# Patient Record
Sex: MALE | Race: WHITE | NOT HISPANIC OR LATINO | Employment: FULL TIME | URBAN - METROPOLITAN AREA
[De-identification: names, ages, dates, MRNs, and addresses within clinical notes are randomized per-mention and may not be internally consistent; named-entity substitution may affect disease eponyms.]

---

## 2019-10-30 ENCOUNTER — OFFICE VISIT (OUTPATIENT)
Dept: GASTROENTEROLOGY | Facility: CLINIC | Age: 49
End: 2019-10-30

## 2019-10-30 VITALS
DIASTOLIC BLOOD PRESSURE: 92 MMHG | BODY MASS INDEX: 25.05 KG/M2 | TEMPERATURE: 98.5 F | SYSTOLIC BLOOD PRESSURE: 140 MMHG | WEIGHT: 175 LBS | HEIGHT: 70 IN

## 2019-10-30 DIAGNOSIS — R10.10 PAIN OF UPPER ABDOMEN: Primary | ICD-10-CM

## 2019-10-30 DIAGNOSIS — Z12.11 ENCOUNTER FOR SCREENING FOR MALIGNANT NEOPLASM OF COLON: ICD-10-CM

## 2019-10-30 PROCEDURE — 99204 OFFICE O/P NEW MOD 45 MIN: CPT | Performed by: INTERNAL MEDICINE

## 2019-10-30 RX ORDER — UBIDECARENONE 100 MG
100 CAPSULE ORAL DAILY
COMMUNITY

## 2019-10-30 RX ORDER — SODIUM CHLORIDE, SODIUM LACTATE, POTASSIUM CHLORIDE, CALCIUM CHLORIDE 600; 310; 30; 20 MG/100ML; MG/100ML; MG/100ML; MG/100ML
30 INJECTION, SOLUTION INTRAVENOUS CONTINUOUS
Status: CANCELLED | OUTPATIENT
Start: 2019-11-20

## 2019-10-30 RX ORDER — OMEPRAZOLE 40 MG/1
40 CAPSULE, DELAYED RELEASE ORAL DAILY
COMMUNITY
Start: 2019-10-14 | End: 2020-10-13

## 2019-10-30 NOTE — PROGRESS NOTES
Chief Complaint   Patient presents with   • Abdominal Pain   • Constipation   • Heartburn     Subjective   HPI     George Magill is a 49 y.o. male who presents for evaluation of abdominal pain    Describes sensation of pain in stomach area.  Present for last 6-12 mos.    Occasional red blood with BMs.  Has been more constipated than usual last few weeks.    Sx associated with overall lack of energy and some unintentional weight loss    Recently seen in UC - rx for carafate and prilosec with improvement in sx.  He has also modified diet and cut out sodas.      Patient works in "[employer] 60+hrs week      Past Medical History:   Diagnosis Date   • Asthma     childhood       Current Outpatient Medications:   •  coenzyme Q10 100 MG capsule, Take 100 mg by mouth Daily., Disp: , Rfl:   •  omeprazole (priLOSEC) 40 MG capsule, Take 40 mg by mouth Daily., Disp: , Rfl:   •  Psyllium (METAMUCIL) 0.36 g capsule, Take  by mouth Daily., Disp: , Rfl:   Allergies   Allergen Reactions   • Asa [Aspirin] Hives and Swelling     Social History     Socioeconomic History   • Marital status: Single     Spouse name: Not on file   • Number of children: Not on file   • Years of education: Not on file   • Highest education level: Not on file   Tobacco Use   • Smoking status: Current Every Day Smoker     Packs/day: 0.25   • Smokeless tobacco: Never Used   • Tobacco comment: ave one cigarette daily    Substance and Sexual Activity   • Alcohol use: Yes     Comment: 1 drink per day average    • Drug use: No     Family History   Problem Relation Age of Onset   • Prostate cancer Father      Review of Systems   Constitutional: Positive for appetite change, fatigue and unexpected weight change.   Gastrointestinal: Positive for abdominal pain, anal bleeding, constipation and nausea.   All other systems reviewed and are negative.    Objective   Vitals:    10/30/19 0946   BP: 140/92   Temp: 98.5 °F (36.9 °C)     Physical Exam   Constitutional: He is  oriented to person, place, and time. He appears well-developed and well-nourished.   HENT:   Head: Normocephalic and atraumatic.   Mouth/Throat: Oropharynx is clear and moist.   Eyes: Conjunctivae are normal. No scleral icterus.   Neck: Normal range of motion. Neck supple. No thyromegaly present.   Cardiovascular: Normal rate and regular rhythm. Exam reveals no friction rub.   No murmur heard.  Pulmonary/Chest: Effort normal and breath sounds normal. No respiratory distress. He has no wheezes. He has no rales. He exhibits no tenderness.   Abdominal: Soft. Bowel sounds are normal. He exhibits no distension and no mass. There is no tenderness. There is no rebound and no guarding. No hernia.   Musculoskeletal: He exhibits no edema.   Lymphadenopathy:     He has no cervical adenopathy.     He has no axillary adenopathy.   Neurological: He is alert and oriented to person, place, and time.   Skin: Skin is warm and dry. No rash noted.   Psychiatric: He has a normal mood and affect. His behavior is normal. Judgment and thought content normal.   Vitals reviewed.    Assessment/Plan   Assessment:     1. Pain of upper abdomen    2. Encounter for screening for malignant neoplasm of colon      Plan:   Recommend we proceed with EGD to evaluate patients upper GI complaints  Continue PPI and carafate for now  Request labs from recent UC visit  Average risk screening colonoscopy will be scheduled at time of EGD as well.         Calvin Dacosta M.D.  [facility]  [address] [place] [address]  Office: [phone]

## 2019-11-20 ENCOUNTER — ANESTHESIA EVENT (OUTPATIENT)
Dept: GASTROENTEROLOGY | Facility: HOSPITAL | Age: 49
End: 2019-11-20

## 2019-11-20 ENCOUNTER — ANESTHESIA (OUTPATIENT)
Dept: GASTROENTEROLOGY | Facility: HOSPITAL | Age: 49
End: 2019-11-20

## 2019-11-20 ENCOUNTER — HOSPITAL ENCOUNTER (OUTPATIENT)
Facility: HOSPITAL | Age: 49
Setting detail: HOSPITAL OUTPATIENT SURGERY
Discharge: HOME | End: 2019-11-20
Attending: INTERNAL MEDICINE | Admitting: INTERNAL MEDICINE

## 2019-11-20 VITALS
SYSTOLIC BLOOD PRESSURE: 133 MMHG | RESPIRATION RATE: 14 BRPM | HEIGHT: 70 IN | DIASTOLIC BLOOD PRESSURE: 94 MMHG | OXYGEN SATURATION: 99 % | HEART RATE: 80 BPM | BODY MASS INDEX: 26.13 KG/M2 | WEIGHT: 182.5 LBS | TEMPERATURE: 98.2 F

## 2019-11-20 DIAGNOSIS — Z12.11 ENCOUNTER FOR SCREENING FOR MALIGNANT NEOPLASM OF COLON: ICD-10-CM

## 2019-11-20 DIAGNOSIS — R10.10 PAIN OF UPPER ABDOMEN: ICD-10-CM

## 2019-11-20 PROCEDURE — 88305 TISSUE EXAM BY PATHOLOGIST: CPT | Performed by: INTERNAL MEDICINE

## 2019-11-20 PROCEDURE — 25010000002 PROPOFOL 10 MG/ML EMULSION: Performed by: NURSE ANESTHETIST, CERTIFIED REGISTERED

## 2019-11-20 PROCEDURE — 43239 EGD BIOPSY SINGLE/MULTIPLE: CPT | Performed by: INTERNAL MEDICINE

## 2019-11-20 PROCEDURE — 45385 COLONOSCOPY W/LESION REMOVAL: CPT | Performed by: INTERNAL MEDICINE

## 2019-11-20 RX ORDER — PROPOFOL 10 MG/ML
VIAL (ML) INTRAVENOUS CONTINUOUS PRN
Status: DISCONTINUED | OUTPATIENT
Start: 2019-11-20 | End: 2019-11-20 | Stop reason: SURG

## 2019-11-20 RX ORDER — HYDROCORTISONE ACETATE 25 MG/1
25 SUPPOSITORY RECTAL NIGHTLY PRN
Qty: 30 SUPPOSITORY | Refills: 1 | Status: SHIPPED | OUTPATIENT
Start: 2019-11-20

## 2019-11-20 RX ORDER — SODIUM CHLORIDE, SODIUM LACTATE, POTASSIUM CHLORIDE, CALCIUM CHLORIDE 600; 310; 30; 20 MG/100ML; MG/100ML; MG/100ML; MG/100ML
30 INJECTION, SOLUTION INTRAVENOUS CONTINUOUS
Status: DISCONTINUED | OUTPATIENT
Start: 2019-11-20 | End: 2019-11-20 | Stop reason: HOSPADM

## 2019-11-20 RX ORDER — PROPOFOL 10 MG/ML
VIAL (ML) INTRAVENOUS AS NEEDED
Status: DISCONTINUED | OUTPATIENT
Start: 2019-11-20 | End: 2019-11-20 | Stop reason: SURG

## 2019-11-20 RX ORDER — LIDOCAINE HYDROCHLORIDE 20 MG/ML
INJECTION, SOLUTION INFILTRATION; PERINEURAL AS NEEDED
Status: DISCONTINUED | OUTPATIENT
Start: 2019-11-20 | End: 2019-11-20 | Stop reason: SURG

## 2019-11-20 RX ORDER — GLYCOPYRROLATE 0.2 MG/ML
INJECTION INTRAMUSCULAR; INTRAVENOUS AS NEEDED
Status: DISCONTINUED | OUTPATIENT
Start: 2019-11-20 | End: 2019-11-20 | Stop reason: SURG

## 2019-11-20 RX ADMIN — PROPOFOL 300 MCG/KG/MIN: 10 INJECTION, EMULSION INTRAVENOUS at 13:25

## 2019-11-20 RX ADMIN — LIDOCAINE HYDROCHLORIDE 100 MG: 20 INJECTION, SOLUTION INFILTRATION; PERINEURAL at 13:25

## 2019-11-20 RX ADMIN — SODIUM CHLORIDE, POTASSIUM CHLORIDE, SODIUM LACTATE AND CALCIUM CHLORIDE 30 ML/HR: 600; 310; 30; 20 INJECTION, SOLUTION INTRAVENOUS at 13:07

## 2019-11-20 RX ADMIN — GLYCOPYRROLATE 100 MCG: 0.2 INJECTION INTRAMUSCULAR; INTRAVENOUS at 13:21

## 2019-11-20 RX ADMIN — SODIUM CHLORIDE, POTASSIUM CHLORIDE, SODIUM LACTATE AND CALCIUM CHLORIDE: 600; 310; 30; 20 INJECTION, SOLUTION INTRAVENOUS at 13:21

## 2019-11-20 RX ADMIN — PROPOFOL 150 MG: 10 INJECTION, EMULSION INTRAVENOUS at 13:25

## 2019-11-20 NOTE — ANESTHESIA POSTPROCEDURE EVALUATION
Patient: George Magill    Procedure Summary     Date:  11/20/19 Room / Location:   [facility]    Anesthesia Start:  1321 Anesthesia Stop:  1358    Procedures:       ESOPHAGOGASTRODUODENOSCOPY WITH BIOPSY (N/A Esophagus)      COLONOSCOPY to cecum wit cold snare polypectomies (N/A ) Diagnosis:       Pain of upper abdomen      Encounter for screening for malignant neoplasm of colon      (Pain of upper abdomen [R10.10])      (Encounter for screening for malignant neoplasm of colon [Z12.11])    Surgeon:  Calvin Dacosta MD Provider:  Jan Merritt MD    Anesthesia Type:  MAC ASA Status:  2          Anesthesia Type: MAC  Last vitals  BP   133/94 (11/20/19 1419)   Temp   36.8 °C (98.2 °F) (11/20/19 1250)   Pulse   80 (11/20/19 1419)   Resp   14 (11/20/19 1419)     SpO2   99 % (11/20/19 1419)     Post Anesthesia Care and Evaluation    Anesthetic complications: No anesthetic complications

## 2019-11-20 NOTE — ANESTHESIA PREPROCEDURE EVALUATION
Anesthesia Evaluation     Patient summary reviewed and Nursing notes reviewed   no history of anesthetic complications:  NPO Solid Status: > 6 hours  NPO Liquid Status: > 6 hours           Airway   Mallampati: II  TM distance: >3 FB  Neck ROM: full  no difficulty expected and No difficulty expected  Dental - normal exam     Pulmonary - normal exam    breath sounds clear to auscultation  (+) asthma,  (-) rhonchi, decreased breath sounds, wheezes, rales, stridor  Cardiovascular - negative cardio ROS and normal exam    NYHA Classification: I  Rhythm: regular  Rate: normal    (-) murmur, weak pulses, friction rub, systolic click, carotid bruits, JVD, peripheral edema      Neuro/Psych- negative ROS  GI/Hepatic/Renal/Endo    (+)  GERD,      Musculoskeletal (-) negative ROS    Abdominal  - normal exam    Abdomen: soft.   Substance History - negative use     OB/GYN negative ob/gyn ROS         Other - negative ROS                       Anesthesia Plan    ASA 2     MAC     intravenous induction     Anesthetic plan, all risks, benefits, and alternatives have been provided, discussed and informed consent has been obtained with: patient.

## 2019-11-21 LAB
CYTO UR: NORMAL
LAB AP CASE REPORT: NORMAL
PATH REPORT.FINAL DX SPEC: NORMAL
PATH REPORT.GROSS SPEC: NORMAL

## 2019-12-06 ENCOUNTER — TELEPHONE (OUTPATIENT)
Dept: GASTROENTEROLOGY | Facility: CLINIC | Age: 49
End: 2019-12-06

## 2019-12-06 NOTE — TELEPHONE ENCOUNTER
----- Message from Calvin Dacosta MD sent at 11/26/2019 11:27 AM EST -----  Negative EGD bx  Colon polyps were benign  Recall 3 years due to borderline prep  O/V in 6 weeks with BG

## 2021-04-02 ENCOUNTER — BULK ORDERING (OUTPATIENT)
Dept: CASE MANAGEMENT | Facility: OTHER | Age: 51
End: 2021-04-02

## 2021-04-02 DIAGNOSIS — Z23 IMMUNIZATION DUE: ICD-10-CM

## 2022-11-17 ENCOUNTER — DOCUMENTATION (OUTPATIENT)
Dept: GASTROENTEROLOGY | Facility: CLINIC | Age: 52
End: 2022-11-17

## 2023-09-13 ENCOUNTER — HOSPITAL ENCOUNTER (INPATIENT)
Facility: HOSPITAL | Age: 53
LOS: 1 days | Discharge: HOME | End: 2023-09-15
Attending: EMERGENCY MEDICINE | Admitting: STUDENT IN AN ORGANIZED HEALTH CARE EDUCATION/TRAINING PROGRAM

## 2023-09-13 ENCOUNTER — APPOINTMENT (OUTPATIENT)
Dept: GENERAL RADIOLOGY | Facility: HOSPITAL | Age: 53
End: 2023-09-13

## 2023-09-13 DIAGNOSIS — R07.9 CHEST PAIN, UNSPECIFIED TYPE: Primary | ICD-10-CM

## 2023-09-13 DIAGNOSIS — I48.92 ATRIAL FLUTTER WITH RAPID VENTRICULAR RESPONSE: ICD-10-CM

## 2023-09-13 LAB
ALBUMIN SERPL-MCNC: 4.3 G/DL (ref 3.5–5.2)
ALBUMIN/GLOB SERPL: 1.6 G/DL
ALP SERPL-CCNC: 120 U/L (ref 39–117)
ALT SERPL W P-5'-P-CCNC: 26 U/L (ref 1–41)
ANION GAP SERPL CALCULATED.3IONS-SCNC: 10 MMOL/L (ref 5–15)
AST SERPL-CCNC: 28 U/L (ref 1–40)
BASOPHILS # BLD AUTO: 0.05 10*3/MM3 (ref 0–0.2)
BASOPHILS NFR BLD AUTO: 0.5 % (ref 0–1.5)
BILIRUB SERPL-MCNC: 0.2 MG/DL (ref 0–1.2)
BUN SERPL-MCNC: 31 MG/DL (ref 6–20)
BUN/CREAT SERPL: 30.7 (ref 7–25)
CALCIUM SPEC-SCNC: 9.3 MG/DL (ref 8.6–10.5)
CHLORIDE SERPL-SCNC: 105 MMOL/L (ref 98–107)
CO2 SERPL-SCNC: 23 MMOL/L (ref 22–29)
CREAT SERPL-MCNC: 1.01 MG/DL (ref 0.76–1.27)
D DIMER PPP FEU-MCNC: 0.44 MCGFEU/ML (ref 0–0.52)
DEPRECATED RDW RBC AUTO: 42.5 FL (ref 37–54)
EGFRCR SERPLBLD CKD-EPI 2021: 89.5 ML/MIN/1.73
EOSINOPHIL # BLD AUTO: 0.39 10*3/MM3 (ref 0–0.4)
EOSINOPHIL NFR BLD AUTO: 4 % (ref 0.3–6.2)
ERYTHROCYTE [DISTWIDTH] IN BLOOD BY AUTOMATED COUNT: 13.4 % (ref 12.3–15.4)
GEN 5 2HR TROPONIN T REFLEX: 27 NG/L
GLOBULIN UR ELPH-MCNC: 2.7 GM/DL
GLUCOSE SERPL-MCNC: 105 MG/DL (ref 65–99)
HCT VFR BLD AUTO: 42.4 % (ref 37.5–51)
HGB BLD-MCNC: 14.7 G/DL (ref 13–17.7)
IMM GRANULOCYTES # BLD AUTO: 0.02 10*3/MM3 (ref 0–0.05)
IMM GRANULOCYTES NFR BLD AUTO: 0.2 % (ref 0–0.5)
LYMPHOCYTES # BLD AUTO: 1.81 10*3/MM3 (ref 0.7–3.1)
LYMPHOCYTES NFR BLD AUTO: 18.8 % (ref 19.6–45.3)
MCH RBC QN AUTO: 30.5 PG (ref 26.6–33)
MCHC RBC AUTO-ENTMCNC: 34.7 G/DL (ref 31.5–35.7)
MCV RBC AUTO: 88 FL (ref 79–97)
MONOCYTES # BLD AUTO: 0.84 10*3/MM3 (ref 0.1–0.9)
MONOCYTES NFR BLD AUTO: 8.7 % (ref 5–12)
NEUTROPHILS NFR BLD AUTO: 6.53 10*3/MM3 (ref 1.7–7)
NEUTROPHILS NFR BLD AUTO: 67.8 % (ref 42.7–76)
NRBC BLD AUTO-RTO: 0 /100 WBC (ref 0–0.2)
NT-PROBNP SERPL-MCNC: 1879 PG/ML (ref 0–900)
PLATELET # BLD AUTO: 201 10*3/MM3 (ref 140–450)
PMV BLD AUTO: 10.2 FL (ref 6–12)
POTASSIUM SERPL-SCNC: 4.3 MMOL/L (ref 3.5–5.2)
PROT SERPL-MCNC: 7 G/DL (ref 6–8.5)
QT INTERVAL: 289 MS
QTC INTERVAL: 446 MS
RBC # BLD AUTO: 4.82 10*6/MM3 (ref 4.14–5.8)
SODIUM SERPL-SCNC: 138 MMOL/L (ref 136–145)
TROPONIN T DELTA: 4 NG/L
TROPONIN T SERPL HS-MCNC: 23 NG/L
TROPONIN T SERPL HS-MCNC: 29 NG/L
WBC NRBC COR # BLD: 9.64 10*3/MM3 (ref 3.4–10.8)

## 2023-09-13 PROCEDURE — G0378 HOSPITAL OBSERVATION PER HR: HCPCS

## 2023-09-13 PROCEDURE — 25010000002 ENOXAPARIN PER 10 MG: Performed by: EMERGENCY MEDICINE

## 2023-09-13 PROCEDURE — 85025 COMPLETE CBC W/AUTO DIFF WBC: CPT | Performed by: EMERGENCY MEDICINE

## 2023-09-13 PROCEDURE — 25010000002 MORPHINE PER 10 MG: Performed by: EMERGENCY MEDICINE

## 2023-09-13 PROCEDURE — 25010000002 DIGOXIN PER 500 MCG: Performed by: STUDENT IN AN ORGANIZED HEALTH CARE EDUCATION/TRAINING PROGRAM

## 2023-09-13 PROCEDURE — 84484 ASSAY OF TROPONIN QUANT: CPT | Performed by: STUDENT IN AN ORGANIZED HEALTH CARE EDUCATION/TRAINING PROGRAM

## 2023-09-13 PROCEDURE — 84484 ASSAY OF TROPONIN QUANT: CPT | Performed by: EMERGENCY MEDICINE

## 2023-09-13 PROCEDURE — 99285 EMERGENCY DEPT VISIT HI MDM: CPT

## 2023-09-13 PROCEDURE — 93005 ELECTROCARDIOGRAM TRACING: CPT | Performed by: EMERGENCY MEDICINE

## 2023-09-13 PROCEDURE — 83880 ASSAY OF NATRIURETIC PEPTIDE: CPT | Performed by: EMERGENCY MEDICINE

## 2023-09-13 PROCEDURE — 80053 COMPREHEN METABOLIC PANEL: CPT | Performed by: EMERGENCY MEDICINE

## 2023-09-13 PROCEDURE — 85379 FIBRIN DEGRADATION QUANT: CPT | Performed by: EMERGENCY MEDICINE

## 2023-09-13 PROCEDURE — 71045 X-RAY EXAM CHEST 1 VIEW: CPT

## 2023-09-13 PROCEDURE — 25010000002 ONDANSETRON PER 1 MG: Performed by: EMERGENCY MEDICINE

## 2023-09-13 PROCEDURE — 93010 ELECTROCARDIOGRAM REPORT: CPT | Performed by: INTERNAL MEDICINE

## 2023-09-13 PROCEDURE — 25010000002 FUROSEMIDE PER 20 MG: Performed by: EMERGENCY MEDICINE

## 2023-09-13 RX ORDER — NITROGLYCERIN 0.4 MG/1
0.4 TABLET SUBLINGUAL
Status: DISCONTINUED | OUTPATIENT
Start: 2023-09-13 | End: 2023-09-15 | Stop reason: HOSPADM

## 2023-09-13 RX ORDER — FAMOTIDINE 20 MG/1
20 TABLET, FILM COATED ORAL ONCE
Status: COMPLETED | OUTPATIENT
Start: 2023-09-13 | End: 2023-09-13

## 2023-09-13 RX ORDER — BISACODYL 10 MG
10 SUPPOSITORY, RECTAL RECTAL DAILY PRN
Status: DISCONTINUED | OUTPATIENT
Start: 2023-09-13 | End: 2023-09-15 | Stop reason: HOSPADM

## 2023-09-13 RX ORDER — ONDANSETRON 2 MG/ML
4 INJECTION INTRAMUSCULAR; INTRAVENOUS ONCE
Status: COMPLETED | OUTPATIENT
Start: 2023-09-13 | End: 2023-09-13

## 2023-09-13 RX ORDER — BISACODYL 5 MG/1
5 TABLET, DELAYED RELEASE ORAL DAILY PRN
Status: DISCONTINUED | OUTPATIENT
Start: 2023-09-13 | End: 2023-09-15 | Stop reason: HOSPADM

## 2023-09-13 RX ORDER — OMEPRAZOLE 40 MG/1
40 CAPSULE, DELAYED RELEASE ORAL AS NEEDED
COMMUNITY

## 2023-09-13 RX ORDER — DIGOXIN 0.25 MG/ML
500 INJECTION INTRAMUSCULAR; INTRAVENOUS ONCE
Status: COMPLETED | OUTPATIENT
Start: 2023-09-13 | End: 2023-09-13

## 2023-09-13 RX ORDER — FUROSEMIDE 10 MG/ML
40 INJECTION INTRAMUSCULAR; INTRAVENOUS ONCE
Status: COMPLETED | OUTPATIENT
Start: 2023-09-13 | End: 2023-09-13

## 2023-09-13 RX ORDER — AMOXICILLIN 250 MG
2 CAPSULE ORAL 2 TIMES DAILY
Status: DISCONTINUED | OUTPATIENT
Start: 2023-09-13 | End: 2023-09-15 | Stop reason: HOSPADM

## 2023-09-13 RX ORDER — POLYETHYLENE GLYCOL 3350 17 G/17G
17 POWDER, FOR SOLUTION ORAL DAILY PRN
Status: DISCONTINUED | OUTPATIENT
Start: 2023-09-13 | End: 2023-09-15 | Stop reason: HOSPADM

## 2023-09-13 RX ORDER — ENOXAPARIN SODIUM 100 MG/ML
1 INJECTION SUBCUTANEOUS EVERY 12 HOURS
Status: DISCONTINUED | OUTPATIENT
Start: 2023-09-14 | End: 2023-09-14

## 2023-09-13 RX ORDER — SODIUM CHLORIDE 0.9 % (FLUSH) 0.9 %
10 SYRINGE (ML) INJECTION EVERY 12 HOURS SCHEDULED
Status: DISCONTINUED | OUTPATIENT
Start: 2023-09-13 | End: 2023-09-15 | Stop reason: HOSPADM

## 2023-09-13 RX ORDER — PANTOPRAZOLE SODIUM 40 MG/1
40 TABLET, DELAYED RELEASE ORAL
Status: DISCONTINUED | OUTPATIENT
Start: 2023-09-13 | End: 2023-09-15 | Stop reason: HOSPADM

## 2023-09-13 RX ORDER — SODIUM CHLORIDE 0.9 % (FLUSH) 0.9 %
10 SYRINGE (ML) INJECTION AS NEEDED
Status: DISCONTINUED | OUTPATIENT
Start: 2023-09-13 | End: 2023-09-15 | Stop reason: HOSPADM

## 2023-09-13 RX ORDER — MORPHINE SULFATE 2 MG/ML
4 INJECTION, SOLUTION INTRAMUSCULAR; INTRAVENOUS ONCE
Status: COMPLETED | OUTPATIENT
Start: 2023-09-13 | End: 2023-09-13

## 2023-09-13 RX ORDER — SODIUM CHLORIDE 9 MG/ML
40 INJECTION, SOLUTION INTRAVENOUS AS NEEDED
Status: DISCONTINUED | OUTPATIENT
Start: 2023-09-13 | End: 2023-09-15 | Stop reason: HOSPADM

## 2023-09-13 RX ORDER — ENOXAPARIN SODIUM 100 MG/ML
1 INJECTION SUBCUTANEOUS ONCE
Status: COMPLETED | OUTPATIENT
Start: 2023-09-13 | End: 2023-09-13

## 2023-09-13 RX ADMIN — ENOXAPARIN SODIUM 80 MG: 100 INJECTION SUBCUTANEOUS at 18:49

## 2023-09-13 RX ADMIN — METOPROLOL TARTRATE 5 MG: 1 INJECTION, SOLUTION INTRAVENOUS at 18:47

## 2023-09-13 RX ADMIN — ONDANSETRON 4 MG: 2 INJECTION INTRAMUSCULAR; INTRAVENOUS at 17:38

## 2023-09-13 RX ADMIN — Medication 10 ML: at 20:15

## 2023-09-13 RX ADMIN — METOPROLOL TARTRATE 5 MG: 1 INJECTION, SOLUTION INTRAVENOUS at 16:44

## 2023-09-13 RX ADMIN — NITROGLYCERIN 0.4 MG: 0.4 TABLET SUBLINGUAL at 19:29

## 2023-09-13 RX ADMIN — METOPROLOL TARTRATE 25 MG: 25 TABLET, FILM COATED ORAL at 20:25

## 2023-09-13 RX ADMIN — NITROGLYCERIN 0.4 MG: 0.4 TABLET SUBLINGUAL at 19:42

## 2023-09-13 RX ADMIN — SODIUM CHLORIDE, POTASSIUM CHLORIDE, SODIUM LACTATE AND CALCIUM CHLORIDE 1000 ML: 600; 310; 30; 20 INJECTION, SOLUTION INTRAVENOUS at 16:47

## 2023-09-13 RX ADMIN — MORPHINE SULFATE 4 MG: 2 INJECTION, SOLUTION INTRAMUSCULAR; INTRAVENOUS at 17:39

## 2023-09-13 RX ADMIN — DIGOXIN 500 MCG: 0.25 INJECTION INTRAMUSCULAR; INTRAVENOUS at 20:15

## 2023-09-13 RX ADMIN — METOPROLOL TARTRATE 5 MG: 1 INJECTION, SOLUTION INTRAVENOUS at 17:41

## 2023-09-13 RX ADMIN — FUROSEMIDE 40 MG: 10 INJECTION, SOLUTION INTRAMUSCULAR; INTRAVENOUS at 21:44

## 2023-09-13 RX ADMIN — FAMOTIDINE 20 MG: 20 TABLET, FILM COATED ORAL at 20:15

## 2023-09-13 RX ADMIN — PANTOPRAZOLE SODIUM 40 MG: 40 TABLET, DELAYED RELEASE ORAL at 20:15

## 2023-09-13 NOTE — ED NOTES
".Nursing report ED to floor  George Magill  53 y.o.  male    HPI :   Chief Complaint   Patient presents with    Chest Pain    Rapid Heart Rate       Admitting doctor:   David Duffy MD    Admitting diagnosis:   The primary encounter diagnosis was Chest pain, unspecified type. A diagnosis of Atrial flutter with rapid ventricular response was also pertinent to this visit.    Code status:   Current Code Status       Date Active Code Status Order ID Comments User Context       Not on file            Allergies:   Asa [aspirin]    Isolation:   No active isolations    Intake and Output    Intake/Output Summary (Last 24 hours) at 9/13/2023 1747  Last data filed at 9/13/2023 1631  Gross per 24 hour   Intake 100 ml   Output --   Net 100 ml       Weight:       09/13/23  1637   Weight: 79.4 kg (175 lb)       Most recent vitals:   Vitals:    09/13/23 1633 09/13/23 1637 09/13/23 1731   BP: (!) 152/102  (!) 131/103   Pulse: (!) 144  (!) 140   Resp: 18     Temp: 98.6 °F (37 °C)     TempSrc: Oral     SpO2: 99%  99%   Weight:  79.4 kg (175 lb)    Height:  177.8 cm (70\")        Active LDAs/IV Access:   Lines, Drains & Airways       Active LDAs       Name Placement date Placement time Site Days    Peripheral IV 09/13/23 1632 Left Antecubital 09/13/23  1632  Antecubital  less than 1    Peripheral IV 09/13/23 1635 Right Antecubital 09/13/23  1635  Antecubital  less than 1                    Labs (abnormal labs have a star):   Labs Reviewed   COMPREHENSIVE METABOLIC PANEL - Abnormal; Notable for the following components:       Result Value    Glucose 105 (*)     BUN 31 (*)     Alkaline Phosphatase 120 (*)     BUN/Creatinine Ratio 30.7 (*)     All other components within normal limits    Narrative:     GFR Normal >60  Chronic Kidney Disease <60  Kidney Failure <15     BNP (IN-HOUSE) - Abnormal; Notable for the following components:    proBNP 1,879.0 (*)     All other components within normal limits    Narrative:     Among patients with " "dyspnea, NT-proBNP is highly sensitive for the detection of acute congestive heart failure. In addition NT-proBNP of <300 pg/ml effectively rules out acute congestive heart failure with 99% negative predictive value.     SINGLE HSTROPONIN T - Abnormal; Notable for the following components:    HS Troponin T 29 (*)     All other components within normal limits    Narrative:     High Sensitive Troponin T Reference Range:  <10.0 ng/L- Negative Female for AMI  <15.0 ng/L- Negative Male for AMI  >=10 - Abnormal Female indicating possible myocardial injury.  >=15 - Abnormal Male indicating possible myocardial injury.   Clinicians would have to utilize clinical acumen, EKG, Troponin, and serial changes to determine if it is an Acute Myocardial Infarction or myocardial injury due to an underlying chronic condition.        CBC WITH AUTO DIFFERENTIAL - Abnormal; Notable for the following components:    Lymphocyte % 18.8 (*)     All other components within normal limits   D-DIMER, QUANTITATIVE - Normal    Narrative:     According to the assay 's published package insert, a normal (<0.50 MCGFEU/mL) D-dimer result in conjunction with a non-high clinical probability assessment, excludes deep vein thrombosis (DVT) and pulmonary embolism (PE) with high sensitivity.    D-dimer values increase with age and this can make VTE exclusion of an older population difficult. To address this, the [employer], based on best available evidence and recent guidelines, recommends that clinicians use age-adjusted D-dimer thresholds in patients greater than 50 years of age with: a) a low probability of PE who do not meet all Pulmonary Embolism Rule Out Criteria, or b) in those with intermediate probability of PE.   The formula for an age-adjusted D-dimer cut-off is \"age/100\".  For example, a 60 year old patient would have an age-adjusted cut-off of 0.60 MCGFEU/mL and an 80 year old 0.80 MCGFEU/mL.   CBC AND DIFFERENTIAL "    Narrative:     The following orders were created for panel order CBC & Differential.  Procedure                               Abnormality         Status                     ---------                               -----------         ------                     CBC Auto Differential[[id number]]        Abnormal            Final result                 Please view results for these tests on the individual orders.       EKG:   ECG 12 Lead Chest Pain   Preliminary Result   HEART RATE= 143  bpm   RR Interval= 420  ms   NV Interval= 181  ms   P Horizontal Axis=   deg   P Front Axis= 252  deg   QRSD Interval= 82  ms   QT Interval= 289  ms   QTcB= 446  ms   QRS Axis= 33  deg   T Wave Axis= 151  deg   - ABNORMAL ECG -   Sinus or ectopic atrial tachycardia   Repol abnrm suggests ischemia, diffuse leads   Electronically Signed By:    Date and Time of Study: 2023-09-13 16:37:54          Meds given in ED:   Medications   sodium chloride 0.9 % flush 10 mL (has no administration in time range)   sodium chloride 0.9 % flush 10 mL (has no administration in time range)   sodium chloride 0.9 % infusion 40 mL (has no administration in time range)   sennosides-docusate (PERICOLACE) 8.6-50 MG per tablet 2 tablet (has no administration in time range)     And   polyethylene glycol (MIRALAX) packet 17 g (has no administration in time range)     And   bisacodyl (DULCOLAX) EC tablet 5 mg (has no administration in time range)     And   bisacodyl (DULCOLAX) suppository 10 mg (has no administration in time range)   nitroglycerin (NITROSTAT) SL tablet 0.4 mg (has no administration in time range)   metoprolol tartrate (LOPRESSOR) tablet 25 mg (has no administration in time range)   metoprolol tartrate (LOPRESSOR) injection 5 mg (has no administration in time range)   furosemide (LASIX) injection 40 mg (has no administration in time range)   Enoxaparin Sodium (LOVENOX) syringe 80 mg (has no administration in time range)   Pharmacy to Dose enoxaparin  (LOVENOX) (has no administration in time range)   lactated ringers bolus 1,000 mL (1,000 mL Intravenous New Bag 9/13/23 1647)   metoprolol tartrate (LOPRESSOR) injection 5 mg (5 mg Intravenous Given 9/13/23 1644)   morphine injection 4 mg (4 mg Intravenous Given 9/13/23 1739)   ondansetron (ZOFRAN) injection 4 mg (4 mg Intravenous Given 9/13/23 1738)   metoprolol tartrate (LOPRESSOR) injection 5 mg (5 mg Intravenous Given 9/13/23 1741)       Imaging results:  XR Chest 1 View    Result Date: 9/13/2023  There appears to be mild cephalization of flow associated with mild vascular engorgement. There is no further evidence for active disease in the chest.   This report was finalized on 9/13/2023 5:42 PM by Dr. Jluis Cotton M.D.       Ambulatory status:   - assist x1    Social issues:   Social History     Socioeconomic History    Marital status: Single   Tobacco Use    Smoking status: Every Day     Packs/day: 0.25     Types: Cigarettes    Smokeless tobacco: Never    Tobacco comments:     ave one cigarette daily    Substance and Sexual Activity    Alcohol use: Yes     Comment: 1 drink per day average     Drug use: No       NIH Stroke Scale:       Tamanna Silva RN  09/13/23 17:47 EDT

## 2023-09-13 NOTE — ED PROVIDER NOTES
EMERGENCY DEPARTMENT ENCOUNTER    Room Number:  N626/1  PCP: Provider, No Known  Historian: Patient      HPI:  Chief Complaint: Chest pain  A complete HPI/ROS/PMH/PSH/SH/FH are unobtainable due to: None  Context: George Magill is a 53 y.o. male who presents to the ED c/o chest pain.  Patient states he has history of reflux and otherwise no medical diagnoses.  Patient states last night he started having discomfort in his neck.  Patient states also had some aching in his chest.  It does get a little bit worse with deep breathing.  States he kept him up all night.  Patient then went to urgent care and was sent here for evaluation.  Patient states he is very anxious.  Patient has had no leg swelling.  Has had no cough or congestion.  Had COVID and flu test at the urgent care that was negative.  Patient has had no fevers or chills.            PAST MEDICAL HISTORY  Active Ambulatory Problems     Diagnosis Date Noted    Pain of upper abdomen 10/30/2019    Encounter for screening for malignant neoplasm of colon 10/30/2019     Resolved Ambulatory Problems     Diagnosis Date Noted    No Resolved Ambulatory Problems     Past Medical History:   Diagnosis Date    Asthma     GERD (gastroesophageal reflux disease)          PAST SURGICAL HISTORY  Past Surgical History:   Procedure Laterality Date    COLONOSCOPY W/ POLYPECTOMY N/A 11/20/2019    Procedure: COLONOSCOPY to cecum wit cold snare polypectomies;  Surgeon: Calvin Dacosta MD;  Location: [facility] ENDOSCOPY;  Service: Gastroenterology    ENDOSCOPY N/A 11/20/2019    Procedure: ESOPHAGOGASTRODUODENOSCOPY WITH BIOPSY;  Surgeon: Calvin Dacosta MD;  Location: [facility] ENDOSCOPY;  Service: Gastroenterology    WISDOM TOOTH EXTRACTION           FAMILY HISTORY  Family History   Problem Relation Age of Onset    Prostate cancer Father          SOCIAL HISTORY  Social History     Socioeconomic History    Marital status: Single   Tobacco Use    Smoking status: Every Day      Packs/day: 1.00     Types: Cigarettes    Smokeless tobacco: Never    Tobacco comments:     ave one cigarette daily    Substance and Sexual Activity    Alcohol use: Yes     Comment: 1 drink per day average     Drug use: No         ALLERGIES  Asa [aspirin]        REVIEW OF SYSTEMS  Review of Systems   Chest pain shortness of breath      PHYSICAL EXAM  ED Triage Vitals [09/13/23 1633]   Temp Heart Rate Resp BP SpO2   98.6 °F (37 °C) (!) 144 18 (!) 152/102 99 %      Temp src Heart Rate Source Patient Position BP Location FiO2 (%)   Oral Monitor -- -- --       Physical Exam      GENERAL: no acute distress  HENT: nares patent  EYES: no scleral icterus  CV: regular rhythm, tachycardic  RESPIRATORY: normal effort  ABDOMEN: soft  MUSCULOSKELETAL: no deformity  NEURO: alert, moves all extremities, follows commands  PSYCH:  calm, cooperative  SKIN: warm, dry    Vital signs and nursing notes reviewed.          LAB RESULTS  Recent Results (from the past 24 hour(s))   ECG 12 Lead Chest Pain    Collection Time: 09/13/23  4:37 PM   Result Value Ref Range    QT Interval 289 ms    QTC Interval 446 ms   Comprehensive Metabolic Panel    Collection Time: 09/13/23  4:39 PM    Specimen: Blood   Result Value Ref Range    Glucose 105 (H) 65 - 99 mg/dL    BUN 31 (H) 6 - 20 mg/dL    Creatinine 1.01 0.76 - 1.27 mg/dL    Sodium 138 136 - 145 mmol/L    Potassium 4.3 3.5 - 5.2 mmol/L    Chloride 105 98 - 107 mmol/L    CO2 23.0 22.0 - 29.0 mmol/L    Calcium 9.3 8.6 - 10.5 mg/dL    Total Protein 7.0 6.0 - 8.5 g/dL    Albumin 4.3 3.5 - 5.2 g/dL    ALT (SGPT) 26 1 - 41 U/L    AST (SGOT) 28 1 - 40 U/L    Alkaline Phosphatase 120 (H) 39 - 117 U/L    Total Bilirubin 0.2 0.0 - 1.2 mg/dL    Globulin 2.7 gm/dL    A/G Ratio 1.6 g/dL    BUN/Creatinine Ratio 30.7 (H) 7.0 - 25.0    Anion Gap 10.0 5.0 - 15.0 mmol/L    eGFR 89.5 >60.0 mL/min/1.73   BNP    Collection Time: 09/13/23  4:39 PM    Specimen: Blood   Result Value Ref Range    proBNP 1,879.0 (H) 0.0 -  900.0 pg/mL   D-dimer, Quantitative    Collection Time: 09/13/23  4:39 PM    Specimen: Blood   Result Value Ref Range    D-Dimer, Quantitative 0.44 0.00 - 0.52 MCGFEU/mL   Single High Sensitivity Troponin T    Collection Time: 09/13/23  4:39 PM    Specimen: Blood   Result Value Ref Range    HS Troponin T 29 (H) <15 ng/L   CBC Auto Differential    Collection Time: 09/13/23  4:39 PM    Specimen: Blood   Result Value Ref Range    WBC 9.64 3.40 - 10.80 10*3/mm3    RBC 4.82 4.14 - 5.80 10*6/mm3    Hemoglobin 14.7 13.0 - 17.7 g/dL    Hematocrit 42.4 37.5 - 51.0 %    MCV 88.0 79.0 - 97.0 fL    MCH 30.5 26.6 - 33.0 pg    MCHC 34.7 31.5 - 35.7 g/dL    RDW 13.4 12.3 - 15.4 %    RDW-SD 42.5 37.0 - 54.0 fl    MPV 10.2 6.0 - 12.0 fL    Platelets 201 140 - 450 10*3/mm3    Neutrophil % 67.8 42.7 - 76.0 %    Lymphocyte % 18.8 (L) 19.6 - 45.3 %    Monocyte % 8.7 5.0 - 12.0 %    Eosinophil % 4.0 0.3 - 6.2 %    Basophil % 0.5 0.0 - 1.5 %    Immature Grans % 0.2 0.0 - 0.5 %    Neutrophils, Absolute 6.53 1.70 - 7.00 10*3/mm3    Lymphocytes, Absolute 1.81 0.70 - 3.10 10*3/mm3    Monocytes, Absolute 0.84 0.10 - 0.90 10*3/mm3    Eosinophils, Absolute 0.39 0.00 - 0.40 10*3/mm3    Basophils, Absolute 0.05 0.00 - 0.20 10*3/mm3    Immature Grans, Absolute 0.02 0.00 - 0.05 10*3/mm3    nRBC 0.0 0.0 - 0.2 /100 WBC       Ordered the above labs and reviewed the results.        RADIOLOGY  XR Chest 1 View    Result Date: 9/13/2023  CHEST SINGLE VIEW  HISTORY: Midsternal chest pain and hypertension.  COMPARISON: None.  FINDINGS: There is mild prominence of the pulmonary vascularity in the upper lobes suspicious for cephalization and mild vascular engorgement though there is no pulmonary edema. No focal airspace disease is evident. There is no pleural effusion. The heart size appears normal. Cardiac monitoring leads are present.      There appears to be mild cephalization of flow associated with mild vascular engorgement. There is no further evidence  for active disease in the chest.   This report was finalized on 9/13/2023 5:42 PM by Dr. Jluis Cotton M.D.       Ordered the above noted radiological studies.  Chest x-ray independently interpreted by me and shows congestive heart failure changes          PROCEDURES  Critical Care  Performed by: Tam Harden MD  Authorized by: Tam Harden MD     Critical care provider statement:     Critical care time (minutes):  35    Critical care time was exclusive of:  Separately billable procedures and treating other patients    Critical care was necessary to treat or prevent imminent or life-threatening deterioration of the following conditions:  Circulatory failure    Critical care was time spent personally by me on the following activities:  Ordering and performing treatments and interventions, ordering and review of laboratory studies, ordering and review of radiographic studies, re-evaluation of patient's condition and discussions with consultants    EKG          EKG time: 1637  Rhythm/Rate: Atrial flutter 143  P waves and MD: No P waves  QRS, axis: Normal QRS  ST and T waves: Nonspecific ST-T wave    Interpreted Contemporaneously by me, independently viewed  No prior      MEDICATIONS GIVEN IN ER  Medications   sodium chloride 0.9 % flush 10 mL (has no administration in time range)   sodium chloride 0.9 % flush 10 mL (has no administration in time range)   sodium chloride 0.9 % infusion 40 mL (has no administration in time range)   sennosides-docusate (PERICOLACE) 8.6-50 MG per tablet 2 tablet (has no administration in time range)     And   polyethylene glycol (MIRALAX) packet 17 g (has no administration in time range)     And   bisacodyl (DULCOLAX) EC tablet 5 mg (has no administration in time range)     And   bisacodyl (DULCOLAX) suppository 10 mg (has no administration in time range)   nitroglycerin (NITROSTAT) SL tablet 0.4 mg (0.4 mg Sublingual Given 9/13/23 1929)   metoprolol tartrate (LOPRESSOR)  tablet 25 mg (has no administration in time range)   furosemide (LASIX) injection 40 mg (has no administration in time range)   Pharmacy to Dose enoxaparin (LOVENOX) (has no administration in time range)   Enoxaparin Sodium (LOVENOX) syringe 80 mg (has no administration in time range)   lactated ringers bolus 1,000 mL (0 mL Intravenous Stopped 9/13/23 1851)   metoprolol tartrate (LOPRESSOR) injection 5 mg (5 mg Intravenous Given 9/13/23 1644)   morphine injection 4 mg (4 mg Intravenous Given 9/13/23 1739)   ondansetron (ZOFRAN) injection 4 mg (4 mg Intravenous Given 9/13/23 1738)   metoprolol tartrate (LOPRESSOR) injection 5 mg (5 mg Intravenous Given 9/13/23 1741)   metoprolol tartrate (LOPRESSOR) injection 5 mg (5 mg Intravenous Given 9/13/23 1847)   Enoxaparin Sodium (LOVENOX) syringe 80 mg (80 mg Subcutaneous Given 9/13/23 1849)                   MEDICAL DECISION MAKING, PROGRESS, and CONSULTS     Discussion below represents my analysis of pertinent findings related to patient's condition, differential diagnosis, treatment plan and final disposition.      Additional sources:  - Discussed/ obtained information from independent historians: None    - External (non-ED) record review: Epic reviewed and patient was seen at urgent care today for chest pain sent in for evaluation    - Chronic or social conditions impacting care: None    - Shared decision making: None      Orders placed during this visit:  Orders Placed This Encounter   Procedures    XR Chest 1 View    Comprehensive Metabolic Panel    BNP    D-dimer, Quantitative    Single High Sensitivity Troponin T    CBC Auto Differential    NPO Diet NPO Type: Strict NPO    Diet: Cardiac Diets; Healthy Heart (2-3 Na+); Texture: Regular Texture (IDDSI 7); Fluid Consistency: Thin (IDDSI 0)    Vital Signs    Intake & Output    Weigh Patient    Oral Care    Telemetry - Maintain IV Access    Telemetry - Place Orders & Notify Provider of Results When Patient Experiences  Acute Chest Pain, Dysrhythmia or Respiratory Distress    May Be Off Telemetry for Tests    Activity - Ad Margaret    LCG (on-call MD unless specified)    Oxygen Therapy- Nasal Cannula; Titrate 1-6 LPM Per SpO2; 90 - 95%    ECG 12 Lead Chest Pain    SCANNED - TELEMETRY      SCANNED - TELEMETRY      SCANNED - TELEMETRY      Insert Peripheral IV    Initiate Observation Status    Initiate Observation Status    CBC & Differential         Additional orders considered but not ordered:  None        Differential diagnosis includes but is not limited to:    ACS, atrial fibrillation, noncardiac chest pain      Independent interpretation of labs, radiology studies, and discussions with consultants:  ED Course as of 09/13/23 1943   Wed Sep 13, 2023   1745 17:45 EDT  Patient presents for evaluation of chest pain.  Has some chest pain that started yesterday.  Patient does appear to be in atrial flutter as well.  Patient has been given 2 doses of metoprolol without much improvement.  Will be given a third dose.  Patient is also given Lasix and Lovenox.  Discussed with Dr. Duffy who will admit. [SL]      ED Course User Index  [SL] Tam Harden MD               DIAGNOSIS  Final diagnoses:   Chest pain, unspecified type   Atrial flutter with rapid ventricular response         DISPOSITION  admit            Latest Documented Vital Signs:  As of 19:43 EDT  BP- 133/93 HR- (!) 135 Temp- 97.9 °F (36.6 °C) (Oral) O2 sat- 96%              --    Please note that portions of this were completed with a voice recognition program.       Note Disclaimer: At [facility], we believe that sharing information builds trust and better relationships. You are receiving this note because you are receiving care at [facility] or recently visited. It is possible you will see health information before a provider has talked with you about it. This kind of information can be easy to misunderstand. To help you fully understand what it means for your  health, we urge you to discuss this note with your provider.            Tam Harden MD  09/13/23 1942

## 2023-09-13 NOTE — ED NOTES
Pt arrives via EMS from [facility] urgent care for chest pain.  Pain started last night at 2000. Pain rating 6/10 that is dull  Pt heart rate in 140s atrial rhythm -atrial flutter.   Pt denies personal cardiac history.  Pt has history of GERD

## 2023-09-13 NOTE — PROGRESS NOTES
"[facility] Clinical Pharmacy Services: Enoxaparin Consult    George Magill has a pharmacy consult to dose full-dose enoxaparin per Dr. David Sethi's request.     Indication: A Fib - requiring full anticoagulation  Home Anticoagulation: none     Relevant clinical data and objective history reviewed:  53 y.o. male 177.8 cm (70\") 79.4 kg (175 lb)   Body mass index is 25.11 kg/m².   Results from last 7 days   Lab Units 09/13/23  1639   PLATELETS 10*3/mm3 201     Estimated Creatinine Clearance: 96.1 mL/min (by C-G formula based on SCr of 1.01 mg/dL).    Assessment/Plan    Will start patient on  Lovenox 80 mg (1mg/kg) subcutaneous every 12 hours    Gilberto Recinos, PharmD, [facility]  Emergency Medicine Clinical Pharmacist   Phone: [phone]      "

## 2023-09-14 ENCOUNTER — ANESTHESIA (OUTPATIENT)
Dept: POSTOP/PACU | Facility: HOSPITAL | Age: 53
DRG: 309 | End: 2023-09-14

## 2023-09-14 ENCOUNTER — ANESTHESIA EVENT (OUTPATIENT)
Dept: POSTOP/PACU | Facility: HOSPITAL | Age: 53
DRG: 309 | End: 2023-09-14

## 2023-09-14 ENCOUNTER — APPOINTMENT (OUTPATIENT)
Dept: POSTOP/PACU | Facility: HOSPITAL | Age: 53
End: 2023-09-14

## 2023-09-14 VITALS
OXYGEN SATURATION: 100 % | DIASTOLIC BLOOD PRESSURE: 57 MMHG | SYSTOLIC BLOOD PRESSURE: 90 MMHG | RESPIRATION RATE: 16 BRPM | HEART RATE: 80 BPM

## 2023-09-14 LAB
BH CV ECHO SHUNT ASSESSMENT PERFORMED (HIDDEN SCRIPTING): 1
GLUCOSE BLDC GLUCOMTR-MCNC: 135 MG/DL (ref 70–130)
SINUS: 3.3 CM

## 2023-09-14 PROCEDURE — 93312 ECHO TRANSESOPHAGEAL: CPT | Performed by: INTERNAL MEDICINE

## 2023-09-14 PROCEDURE — 92960 CARDIOVERSION ELECTRIC EXT: CPT

## 2023-09-14 PROCEDURE — 25010000002 PROPOFOL 10 MG/ML EMULSION: Performed by: REGISTERED NURSE

## 2023-09-14 PROCEDURE — 92960 CARDIOVERSION ELECTRIC EXT: CPT | Performed by: INTERNAL MEDICINE

## 2023-09-14 PROCEDURE — 93325 DOPPLER ECHO COLOR FLOW MAPG: CPT

## 2023-09-14 PROCEDURE — G0378 HOSPITAL OBSERVATION PER HR: HCPCS

## 2023-09-14 PROCEDURE — 93320 DOPPLER ECHO COMPLETE: CPT

## 2023-09-14 PROCEDURE — 93312 ECHO TRANSESOPHAGEAL: CPT

## 2023-09-14 PROCEDURE — 93005 ELECTROCARDIOGRAM TRACING: CPT | Performed by: INTERNAL MEDICINE

## 2023-09-14 PROCEDURE — 93320 DOPPLER ECHO COMPLETE: CPT | Performed by: INTERNAL MEDICINE

## 2023-09-14 PROCEDURE — 93325 DOPPLER ECHO COLOR FLOW MAPG: CPT | Performed by: INTERNAL MEDICINE

## 2023-09-14 PROCEDURE — 5A2204Z RESTORATION OF CARDIAC RHYTHM, SINGLE: ICD-10-PCS | Performed by: INTERNAL MEDICINE

## 2023-09-14 PROCEDURE — 82948 REAGENT STRIP/BLOOD GLUCOSE: CPT

## 2023-09-14 PROCEDURE — 25010000002 ENOXAPARIN PER 10 MG: Performed by: STUDENT IN AN ORGANIZED HEALTH CARE EDUCATION/TRAINING PROGRAM

## 2023-09-14 RX ORDER — PROMETHAZINE HYDROCHLORIDE 25 MG/1
25 TABLET ORAL ONCE AS NEEDED
Status: DISCONTINUED | OUTPATIENT
Start: 2023-09-14 | End: 2023-09-15 | Stop reason: HOSPADM

## 2023-09-14 RX ORDER — LOSARTAN POTASSIUM 25 MG/1
25 TABLET ORAL DAILY
Status: DISCONTINUED | OUTPATIENT
Start: 2023-09-14 | End: 2023-09-15 | Stop reason: HOSPADM

## 2023-09-14 RX ORDER — ONDANSETRON 2 MG/ML
4 INJECTION INTRAMUSCULAR; INTRAVENOUS ONCE AS NEEDED
Status: DISCONTINUED | OUTPATIENT
Start: 2023-09-14 | End: 2023-09-15 | Stop reason: HOSPADM

## 2023-09-14 RX ORDER — FLUMAZENIL 0.1 MG/ML
0.2 INJECTION INTRAVENOUS AS NEEDED
Status: DISCONTINUED | OUTPATIENT
Start: 2023-09-14 | End: 2023-09-15 | Stop reason: HOSPADM

## 2023-09-14 RX ORDER — PROPOFOL 10 MG/ML
VIAL (ML) INTRAVENOUS AS NEEDED
Status: DISCONTINUED | OUTPATIENT
Start: 2023-09-14 | End: 2023-09-14 | Stop reason: SURG

## 2023-09-14 RX ORDER — DROPERIDOL 2.5 MG/ML
0.62 INJECTION, SOLUTION INTRAMUSCULAR; INTRAVENOUS
Status: DISCONTINUED | OUTPATIENT
Start: 2023-09-14 | End: 2023-09-15 | Stop reason: HOSPADM

## 2023-09-14 RX ORDER — HYDROMORPHONE HYDROCHLORIDE 1 MG/ML
0.5 INJECTION, SOLUTION INTRAMUSCULAR; INTRAVENOUS; SUBCUTANEOUS
Status: DISCONTINUED | OUTPATIENT
Start: 2023-09-14 | End: 2023-09-15 | Stop reason: HOSPADM

## 2023-09-14 RX ORDER — LABETALOL HYDROCHLORIDE 5 MG/ML
5 INJECTION, SOLUTION INTRAVENOUS
Status: DISCONTINUED | OUTPATIENT
Start: 2023-09-14 | End: 2023-09-15 | Stop reason: HOSPADM

## 2023-09-14 RX ORDER — NALOXONE HCL 0.4 MG/ML
0.2 VIAL (ML) INJECTION AS NEEDED
Status: DISCONTINUED | OUTPATIENT
Start: 2023-09-14 | End: 2023-09-15 | Stop reason: HOSPADM

## 2023-09-14 RX ORDER — OXYCODONE AND ACETAMINOPHEN 7.5; 325 MG/1; MG/1
1 TABLET ORAL EVERY 4 HOURS PRN
Status: DISCONTINUED | OUTPATIENT
Start: 2023-09-14 | End: 2023-09-15 | Stop reason: HOSPADM

## 2023-09-14 RX ORDER — EPHEDRINE SULFATE 50 MG/ML
5 INJECTION, SOLUTION INTRAVENOUS ONCE AS NEEDED
Status: DISCONTINUED | OUTPATIENT
Start: 2023-09-14 | End: 2023-09-15 | Stop reason: HOSPADM

## 2023-09-14 RX ORDER — PROMETHAZINE HYDROCHLORIDE 25 MG/1
25 SUPPOSITORY RECTAL ONCE AS NEEDED
Status: DISCONTINUED | OUTPATIENT
Start: 2023-09-14 | End: 2023-09-15 | Stop reason: HOSPADM

## 2023-09-14 RX ORDER — FENTANYL CITRATE 50 UG/ML
50 INJECTION, SOLUTION INTRAMUSCULAR; INTRAVENOUS
Status: DISCONTINUED | OUTPATIENT
Start: 2023-09-14 | End: 2023-09-15 | Stop reason: HOSPADM

## 2023-09-14 RX ORDER — NICOTINE 21 MG/24HR
1 PATCH, TRANSDERMAL 24 HOURS TRANSDERMAL NIGHTLY
Status: DISCONTINUED | OUTPATIENT
Start: 2023-09-14 | End: 2023-09-15 | Stop reason: HOSPADM

## 2023-09-14 RX ORDER — HYDROCODONE BITARTRATE AND ACETAMINOPHEN 7.5; 325 MG/1; MG/1
1 TABLET ORAL ONCE AS NEEDED
Status: COMPLETED | OUTPATIENT
Start: 2023-09-14 | End: 2023-09-14

## 2023-09-14 RX ORDER — DIPHENHYDRAMINE HYDROCHLORIDE 50 MG/ML
12.5 INJECTION INTRAMUSCULAR; INTRAVENOUS
Status: DISCONTINUED | OUTPATIENT
Start: 2023-09-14 | End: 2023-09-15 | Stop reason: HOSPADM

## 2023-09-14 RX ORDER — IPRATROPIUM BROMIDE AND ALBUTEROL SULFATE 2.5; .5 MG/3ML; MG/3ML
3 SOLUTION RESPIRATORY (INHALATION) ONCE AS NEEDED
Status: DISCONTINUED | OUTPATIENT
Start: 2023-09-14 | End: 2023-09-15 | Stop reason: HOSPADM

## 2023-09-14 RX ORDER — AMIODARONE HYDROCHLORIDE 200 MG/1
400 TABLET ORAL EVERY 12 HOURS SCHEDULED
Status: DISCONTINUED | OUTPATIENT
Start: 2023-09-14 | End: 2023-09-15 | Stop reason: HOSPADM

## 2023-09-14 RX ORDER — SODIUM CHLORIDE, SODIUM LACTATE, POTASSIUM CHLORIDE, CALCIUM CHLORIDE 600; 310; 30; 20 MG/100ML; MG/100ML; MG/100ML; MG/100ML
INJECTION, SOLUTION INTRAVENOUS CONTINUOUS PRN
Status: DISCONTINUED | OUTPATIENT
Start: 2023-09-14 | End: 2023-09-14 | Stop reason: SURG

## 2023-09-14 RX ORDER — HYDRALAZINE HYDROCHLORIDE 20 MG/ML
5 INJECTION INTRAMUSCULAR; INTRAVENOUS
Status: DISCONTINUED | OUTPATIENT
Start: 2023-09-14 | End: 2023-09-15 | Stop reason: HOSPADM

## 2023-09-14 RX ORDER — LIDOCAINE HYDROCHLORIDE 20 MG/ML
INJECTION, SOLUTION INFILTRATION; PERINEURAL AS NEEDED
Status: DISCONTINUED | OUTPATIENT
Start: 2023-09-14 | End: 2023-09-14 | Stop reason: SURG

## 2023-09-14 RX ADMIN — SODIUM CHLORIDE, SODIUM LACTATE, POTASSIUM CHLORIDE, AND CALCIUM CHLORIDE: 600; 310; 30; 20 INJECTION, SOLUTION INTRAVENOUS at 14:00

## 2023-09-14 RX ADMIN — Medication 10 ML: at 08:35

## 2023-09-14 RX ADMIN — LIDOCAINE HYDROCHLORIDE 100 MG: 20 INJECTION, SOLUTION INFILTRATION; PERINEURAL at 14:02

## 2023-09-14 RX ADMIN — METOPROLOL TARTRATE 25 MG: 25 TABLET, FILM COATED ORAL at 08:35

## 2023-09-14 RX ADMIN — Medication 10 ML: at 21:40

## 2023-09-14 RX ADMIN — PROPOFOL 150 MG: 10 INJECTION, EMULSION INTRAVENOUS at 14:02

## 2023-09-14 RX ADMIN — HYDROCODONE BITARTRATE AND ACETAMINOPHEN 1 TABLET: 7.5; 325 TABLET ORAL at 21:48

## 2023-09-14 RX ADMIN — ENOXAPARIN SODIUM 80 MG: 100 INJECTION SUBCUTANEOUS at 06:37

## 2023-09-14 RX ADMIN — METOPROLOL TARTRATE 25 MG: 25 TABLET, FILM COATED ORAL at 21:39

## 2023-09-14 RX ADMIN — PROPOFOL 20 MG: 10 INJECTION, EMULSION INTRAVENOUS at 14:10

## 2023-09-14 RX ADMIN — PROPOFOL 20 MG: 10 INJECTION, EMULSION INTRAVENOUS at 14:06

## 2023-09-14 RX ADMIN — Medication 1 PATCH: at 21:39

## 2023-09-14 RX ADMIN — PROPOFOL 30 MG: 10 INJECTION, EMULSION INTRAVENOUS at 14:04

## 2023-09-14 RX ADMIN — METOPROLOL TARTRATE 25 MG: 25 TABLET, FILM COATED ORAL at 15:47

## 2023-09-14 RX ADMIN — PANTOPRAZOLE SODIUM 40 MG: 40 TABLET, DELAYED RELEASE ORAL at 17:56

## 2023-09-14 RX ADMIN — PANTOPRAZOLE SODIUM 40 MG: 40 TABLET, DELAYED RELEASE ORAL at 06:37

## 2023-09-14 RX ADMIN — METOPROLOL TARTRATE 25 MG: 25 TABLET, FILM COATED ORAL at 02:08

## 2023-09-14 RX ADMIN — AMIODARONE HYDROCHLORIDE 400 MG: 200 TABLET ORAL at 17:56

## 2023-09-14 RX ADMIN — PROPOFOL 20 MG: 10 INJECTION, EMULSION INTRAVENOUS at 14:08

## 2023-09-14 RX ADMIN — APIXABAN 5 MG: 5 TABLET, FILM COATED ORAL at 21:39

## 2023-09-14 RX ADMIN — LOSARTAN POTASSIUM 25 MG: 25 TABLET, FILM COATED ORAL at 15:47

## 2023-09-14 NOTE — H&P
Date of Hospital Visit: 23  Encounter Provider: James Archibald MD  Place of Service: [facility] CARDIOLOGY  Patient Name: George Magill  :1970  Referral Provider: Tam Harden MD    Chief complaint Chest Pain    History of Present Illness  George Magill is a 53 y.o. man with GERD who presented to the urgent care on 23 with complaints of chest pain. He said it started as discomfort in his neck the night before and some aching in his chest. He reported feeling very anxious. He was sent to the ED at [facility] for further evaluation. He was found to be in Atrial Flutter with 2:1 AV block.    On arrival to the ED, he was afebrile, tachycardic to the 130s/140s, /102, O2 sat 99% on RA. EKG with 2:1 atrial flutter.    Hs Troponin 29 -> 23 -> 27. Creatinine 1.01, D-dimer 0.44.    CXR show mild cephalization of flow associated with mild vascular engorgement. No further evidence for active disease in the chest.     Chest pain has improved. He does not have palpitations.    Smoking: ppd x 20-30 years  EtOH: less over the past 2 years, Rarely now but used to drink every night for 30 years      Past Medical History:   Diagnosis Date    Asthma     childhood    GERD (gastroesophageal reflux disease)        Past Surgical History:   Procedure Laterality Date    COLONOSCOPY W/ POLYPECTOMY N/A 2019    Procedure: COLONOSCOPY to cecum wit cold snare polypectomies;  Surgeon: Calvin Dacosta MD;  Location: [facility] ENDOSCOPY;  Service: Gastroenterology    ENDOSCOPY N/A 2019    Procedure: ESOPHAGOGASTRODUODENOSCOPY WITH BIOPSY;  Surgeon: Calvin Dacosta MD;  Location: [facility] ENDOSCOPY;  Service: Gastroenterology    WISDOM TOOTH EXTRACTION         Medications Prior to Admission   Medication Sig Dispense Refill Last Dose    omeprazole (priLOSEC) 40 MG capsule Take 1 capsule by mouth As Needed. Patient states he takes three times a day   2023       Current  "Meds  Scheduled Meds:enoxaparin, 1 mg/kg, Subcutaneous, Q12H  metoprolol tartrate, 25 mg, Oral, Q6H  pantoprazole, 40 mg, Oral, BID AC  senna-docusate sodium, 2 tablet, Oral, BID  sodium chloride, 10 mL, Intravenous, Q12H      Continuous Infusions:Pharmacy to Dose enoxaparin (LOVENOX),       PRN Meds:.  senna-docusate sodium **AND** polyethylene glycol **AND** bisacodyl **AND** bisacodyl    nitroglycerin    Pharmacy to Dose enoxaparin (LOVENOX)    sodium chloride    sodium chloride    Allergies as of 09/13/2023 - Reviewed 09/13/2023   Allergen Reaction Noted    Asa [aspirin] Hives and Swelling 10/30/2019       Social History     Socioeconomic History    Marital status: Single   Tobacco Use    Smoking status: Every Day     Packs/day: 1.00     Types: Cigarettes    Smokeless tobacco: Never    Tobacco comments:     ave one cigarette daily    Vaping Use    Vaping Use: Never used   Substance and Sexual Activity    Alcohol use: Yes     Comment: 1 drink per day average     Drug use: No       Family History   Problem Relation Age of Onset    Prostate cancer Father        REVIEW OF SYSTEMS:   12 point ROS was performed and is negative except as outlined in HPI          Objective:   Temp:  [97.9 °F (36.6 °C)-100 °F (37.8 °C)] 98.6 °F (37 °C)  Heart Rate:  [] 130  Resp:  [18-20] 20  BP: (109-175)/() 146/97  Body mass index is 25.58 kg/m².  Flowsheet Rows      Flowsheet Row First Filed Value   Admission Height 177.8 cm (70\") Documented at 09/13/2023 1637   Admission Weight 79.4 kg (175 lb) Documented at 09/13/2023 1637          Vitals:    09/14/23 0836   BP:    Pulse:    Resp:    Temp:    SpO2: 93%       GEN: no distress, alert and oriented  HEENT: NACT, EOMI, moist mucous membranes, nasal cannula in place  Lungs: CTAB, no wheezes, rales or rhonchi  CV: tachycardic rate, regular rhythm, normal S1, S2, no murmurs, +2 radial pulses b/l, no carotid bruit  Abdomen: soft, nontender, nondistended, NABS  Extremities: no " edema  Skin: no rash, warm, dry  Heme/Lymph: no bruising  Psych: organized thought, normal behavior and affect      Lab Review:   Results from last 7 days   Lab Units 09/13/23  1639   SODIUM mmol/L 138   POTASSIUM mmol/L 4.3   CHLORIDE mmol/L 105   CO2 mmol/L 23.0   BUN mg/dL 31*   CREATININE mg/dL 1.01   CALCIUM mg/dL 9.3   BILIRUBIN mg/dL 0.2   ALK PHOS U/L 120*   ALT (SGPT) U/L 26   AST (SGOT) U/L 28   GLUCOSE mg/dL 105*     Results from last 7 days   Lab Units 09/13/23  2222 09/13/23  2019 09/13/23  1639   HSTROP T ng/L 27* 23* 29*     Results from last 7 days   Lab Units 09/13/23  1639   WBC 10*3/mm3 9.64   HEMOGLOBIN g/dL 14.7   HEMATOCRIT % 42.4   PLATELETS 10*3/mm3 201       9/13/23          I personally viewed and interpreted the patient's EKG/Telemetry data)      Atrial flutter    Chest pain    Assessment and Plan:  #Atrial flutter    George Magill is a 53 y.o. man with GERD who presented to the urgent care on 9/13/23 with complaints of chest pain, found to be in atrial flutter.    - continue lovenox  - NPO for possible HERRERA/DCCV today    James Archibald MD  09/14/23  08:56 EDT.

## 2023-09-14 NOTE — PLAN OF CARE
Goal Outcome Evaluation:  Plan of Care Reviewed With: patient        Progress: no change  Outcome Evaluation: New admit tonight with chest pain and atrial flutter. Needs met. Stand-by assist to toilet. NPO. VSS. Oriented x 4. RA. Atrial flutter on the monitor with rates in the 1teens-130s. Cardiology aware. Nitro SL x 2 to relieve chest pain with some relief. Digoxin x 1. Lovenox. IV Lasix x 1. Scheduled metoprolol Q6H.

## 2023-09-14 NOTE — ANESTHESIA POSTPROCEDURE EVALUATION
"Patient: George Magill    Procedure Summary       Date: 09/14/23 Room / Location: [facility] PACU    Anesthesia Start: 1400 Anesthesia Stop: 1425    Procedures:       ADULT TRANSESOPHAGEAL ECHO (HERRERA) W/ CONT IF NECESSARY PER PROTOCOL      [facility] IN CARDIOLOGY DEPARTMENT Diagnosis:       (Arrhythmia)      (Pre-cardioversion)      (Atrial flutter)    Scheduled Providers:  Provider: Lex Mckenzie MD    Anesthesia Type: general ASA Status: 3            Anesthesia Type: general    Vitals  Vitals Value Taken Time   /86 09/14/23 1506   Temp     Pulse 85 09/14/23 1506   Resp 16 09/14/23 1450   SpO2 98 % 09/14/23 1506   Vitals shown include unvalidated device data.        Post Anesthesia Care and Evaluation    Patient location during evaluation: bedside  Patient participation: complete - patient participated  Level of consciousness: awake and alert  Pain score: 0  Pain management: adequate    Airway patency: patent  Anesthetic complications: No anesthetic complications    Cardiovascular status: acceptable  Respiratory status: acceptable  Hydration status: acceptable    Comments: /76   Pulse 84   Temp 37 °C (98.6 °F) (Oral)   Resp 16   Ht 177.8 cm (70\")   Wt 80.9 kg (178 lb 4.8 oz)   SpO2 95%   BMI 25.58 kg/m²     "

## 2023-09-14 NOTE — ANESTHESIA PREPROCEDURE EVALUATION
Anesthesia Evaluation     Patient summary reviewed and Nursing notes reviewed   no history of anesthetic complications:   NPO Solid Status: > 8 hours  NPO Liquid Status: > 2 hours           Airway   Mallampati: II  TM distance: >3 FB  Neck ROM: full  no difficulty expected  Dental - normal exam   (+) poor dentition    Pulmonary - normal exam   (+) a smoker Current Abstained day of surgery, asthma,  (-) COPD, lung cancer  Cardiovascular - normal exam  Exercise tolerance: good (4-7 METS)    ECG reviewed  Rhythm: regular  Rate: normal    (+) dysrhythmias Atrial Fib, Atrial Flutter  (-) hypertension, valvular problems/murmurs, past MI, CAD, cardiac stents, CABG    ROS comment: New onset afib/flutter    Neuro/Psych- negative ROS  (-) seizures, TIA, CVA  GI/Hepatic/Renal/Endo    (+) GERD well controlled  (-) hiatal hernia, PUD, hepatitis, liver disease, no renal disease, diabetes, GI bleed, no thyroid disorder    Musculoskeletal (-) negative ROS    Abdominal  - normal exam   Substance History - negative use     OB/GYN          Other - negative ROS                     Anesthesia Plan    ASA 3     general     intravenous induction     Anesthetic plan, risks, benefits, and alternatives have been provided, discussed and informed consent has been obtained with: patient.    CODE STATUS:

## 2023-09-15 VITALS
SYSTOLIC BLOOD PRESSURE: 151 MMHG | RESPIRATION RATE: 16 BRPM | OXYGEN SATURATION: 100 % | TEMPERATURE: 97.7 F | BODY MASS INDEX: 25.53 KG/M2 | HEIGHT: 70 IN | HEART RATE: 81 BPM | DIASTOLIC BLOOD PRESSURE: 106 MMHG | WEIGHT: 178.3 LBS

## 2023-09-15 LAB
GLUCOSE BLDC GLUCOMTR-MCNC: 112 MG/DL (ref 70–130)
GLUCOSE BLDC GLUCOMTR-MCNC: 116 MG/DL (ref 70–130)
QT INTERVAL: 388 MS
QTC INTERVAL: 451 MS

## 2023-09-15 PROCEDURE — 82948 REAGENT STRIP/BLOOD GLUCOSE: CPT

## 2023-09-15 RX ORDER — ACETAMINOPHEN 500 MG
1000 TABLET ORAL EVERY 8 HOURS PRN
Status: DISCONTINUED | OUTPATIENT
Start: 2023-09-15 | End: 2023-09-15 | Stop reason: HOSPADM

## 2023-09-15 RX ORDER — ACETAMINOPHEN 500 MG
1000 TABLET ORAL EVERY 8 HOURS PRN
Qty: 30 TABLET | Refills: 0 | Status: SHIPPED | OUTPATIENT
Start: 2023-09-15

## 2023-09-15 RX ORDER — AMIODARONE HYDROCHLORIDE 200 MG/1
TABLET ORAL
Qty: 98 TABLET | Refills: 0 | Status: SHIPPED | OUTPATIENT
Start: 2023-09-15 | End: 2023-11-27

## 2023-09-15 RX ORDER — NICOTINE 21 MG/24HR
1 PATCH, TRANSDERMAL 24 HOURS TRANSDERMAL NIGHTLY
Qty: 28 PATCH | Refills: 11 | Status: SHIPPED | OUTPATIENT
Start: 2023-09-15

## 2023-09-15 RX ORDER — LOSARTAN POTASSIUM 25 MG/1
25 TABLET ORAL DAILY
Qty: 90 TABLET | Refills: 3 | Status: SHIPPED | OUTPATIENT
Start: 2023-09-15

## 2023-09-15 RX ORDER — METOPROLOL SUCCINATE 25 MG/1
25 TABLET, EXTENDED RELEASE ORAL DAILY
Qty: 90 TABLET | Refills: 3 | Status: SHIPPED | OUTPATIENT
Start: 2023-09-15

## 2023-09-15 RX ADMIN — METOPROLOL TARTRATE 25 MG: 25 TABLET, FILM COATED ORAL at 10:19

## 2023-09-15 RX ADMIN — ACETAMINOPHEN 1000 MG: 500 TABLET ORAL at 10:20

## 2023-09-15 RX ADMIN — LOSARTAN POTASSIUM 25 MG: 25 TABLET, FILM COATED ORAL at 10:20

## 2023-09-15 RX ADMIN — METOPROLOL TARTRATE 25 MG: 25 TABLET, FILM COATED ORAL at 03:43

## 2023-09-15 RX ADMIN — PANTOPRAZOLE SODIUM 40 MG: 40 TABLET, DELAYED RELEASE ORAL at 06:43

## 2023-09-15 RX ADMIN — AMIODARONE HYDROCHLORIDE 400 MG: 200 TABLET ORAL at 10:19

## 2023-09-15 RX ADMIN — APIXABAN 5 MG: 5 TABLET, FILM COATED ORAL at 10:19

## 2023-09-15 NOTE — CASE MANAGEMENT/SOCIAL WORK
Case Management Discharge Note      Final Note: home no needs         Selected Continued Care - Discharged on 9/15/2023 Admission date: 9/13/2023 - Discharge disposition: Home or Self Care      Destination    No services have been selected for the patient.                Durable Medical Equipment    No services have been selected for the patient.                Dialysis/Infusion    No services have been selected for the patient.                Home Medical Care    No services have been selected for the patient.                Therapy    No services have been selected for the patient.                Community Resources    No services have been selected for the patient.                Community & DME    No services have been selected for the patient.                    Transportation Services  Private: Car    Final Discharge Disposition Code: 01 - home or self-care

## 2023-09-15 NOTE — PLAN OF CARE
Problem: Adult Inpatient Plan of Care  Goal: Plan of Care Review  Flowsheets (Taken 9/15/2023 0234)  Progress: improving  Plan of Care Reviewed With: patient  Outcome Evaluation: No acute changes. NSR on tele. JOSE/ shoulder pain. Medicated for pain per MAR. Ice packs in use. Makes needs known. Up w/ stand-by. Pleasant. Bed locked and in lowest position. Side rails up x2. Call light within reach. Will continue to assess.  Goal: Absence of Hospital-Acquired Illness or Injury  Intervention: Identify and Manage Fall Risk  Recent Flowsheet Documentation  Taken 9/15/2023 0234 by Jose L Ruby, RN  Safety Promotion/Fall Prevention:   activity supervised   assistive device/personal items within reach   clutter free environment maintained   fall prevention program maintained   lighting adjusted   nonskid shoes/slippers when out of bed   room organization consistent   safety round/check completed  Taken 9/15/2023 0031 by Jose L Ruby, PAYTON  Safety Promotion/Fall Prevention:   activity supervised   assistive device/personal items within reach   clutter free environment maintained   fall prevention program maintained   lighting adjusted   nonskid shoes/slippers when out of bed   room organization consistent   safety round/check completed  Taken 9/14/2023 2240 by Jose L Ruby, RN  Safety Promotion/Fall Prevention:   activity supervised   assistive device/personal items within reach   clutter free environment maintained   fall prevention program maintained   lighting adjusted   nonskid shoes/slippers when out of bed   safety round/check completed   room organization consistent  Taken 9/14/2023 2003 by Jose L Ruby, RN  Safety Promotion/Fall Prevention:   activity supervised   assistive device/personal items within reach   clutter free environment maintained   fall prevention program maintained   lighting adjusted   nonskid shoes/slippers when out of bed   safety round/check completed   room organization consistent  Intervention: Prevent  Skin Injury  Recent Flowsheet Documentation  Taken 9/15/2023 0234 by Jose L Ruby RN  Body Position: position changed independently  Taken 9/15/2023 0031 by Jose L Ruby RN  Body Position: position changed independently  Skin Protection:   adhesive use limited   incontinence pads utilized   tubing/devices free from skin contact   transparent dressing maintained  Taken 9/14/2023 2240 by Jose L Ruby RN  Body Position: position changed independently  Taken 9/14/2023 2003 by Jose L Ruby RN  Body Position: position changed independently  Skin Protection:   adhesive use limited   incontinence pads utilized   transparent dressing maintained   tubing/devices free from skin contact  Intervention: Prevent and Manage VTE (Venous Thromboembolism) Risk  Flowsheets  Taken 9/15/2023 0100 by Latanya Bush  Activity Management: activity encouraged  Taken 9/15/2023 0031 by Jose L Ruby RN  Activity Management: activity encouraged  Taken 9/14/2023 2240 by Jose L Ruby RN  Activity Management: activity encouraged  Taken 9/14/2023 2003 by Jose L Ruby RN  Activity Management: activity encouraged  Intervention: Prevent Infection  Flowsheets (Taken 9/14/2023 0500 by Latanya Bush)  Infection Prevention:   environmental surveillance performed   rest/sleep promoted  Goal: Optimal Comfort and Wellbeing  Intervention: Monitor Pain and Promote Comfort  Flowsheets (Taken 9/14/2023 2003)  Pain Management Interventions: see MAR  Intervention: Provide Person-Centered Care  Flowsheets  Taken 9/15/2023 0031  Trust Relationship/Rapport:   care explained   choices provided   emotional support provided   questions answered   empathic listening provided   reassurance provided   questions encouraged   thoughts/feelings acknowledged  Taken 9/14/2023 2003  Trust Relationship/Rapport:   care explained   choices provided   emotional support provided   empathic listening provided   questions answered   questions encouraged   reassurance provided    thoughts/feelings acknowledged  Goal: Readiness for Transition of Care  Intervention: Mutually Develop Transition Plan  Flowsheets  Taken 9/13/2023 1932 by Phylicia Bailey, RN  Equipment Currently Used at Home: none  Taken 9/13/2023 1930 by Phylicia Bailey, RN  Transportation Anticipated: family or friend will provide  Patient/Family Anticipated Services at Transition: none  Patient/Family Anticipates Transition to: home   Goal Outcome Evaluation:  Plan of Care Reviewed With: patient        Progress: improving  Outcome Evaluation: No acute changes. NSR on tele. JOSE/ shoulder pain. Medicated for pain per MAR. Ice packs in use. Makes needs known. Up w/ stand-by. Pleasant. Bed locked and in lowest position. Side rails up x2. Call light within reach. Will continue to assess.

## 2023-09-18 NOTE — DISCHARGE SUMMARY
[facility] Discharge Summary      Patient Name: George Magill  Age/Sex: 53 y.o. male  : 1970  MRN: [id number]    Encounter Provider: James Barton MD  Referring Provider: Tam Harden MD  Place of Service: [facility] CARDIOLOGY  Patient Care Team:  Provider, No Known as PCP - General         Date of Discharge:  2023     Date of Admit: 2023      Discharge Diagnosis:   Atrial flutter    Chest pain        Hospital Course: 53 y.o. man with GERD who presented to the urgent care on 23 with complaints of chest pain, found to be in atrial flutter.  He underwent successful HERRERA cardioversion on 2023.  HERRERA revealed EF of 26 to 30% and this was believed to be secondary to tachycardia induced cardiomyopathy.  Given intermittent ectopy, he was started on amiodarone load, which will hopefully be short-term.  He was started on Eliquis 5 mg twice daily. For his HFrEF, he was started on Toprol 25 mg daily, and losartan 25 mg daily.    Vitals:    09/15/23 0830   BP:    Pulse: 81   Resp:    Temp:    SpO2: 100%     Physical Exam:  GEN: no distress, alert and oriented  HEENT: NACT, EOMI, moist mucous membranes  Lungs: CTAB, no wheezes, rales or rhonchi  CV: normal rate, regular rhythm, normal S1, S2, no murmurs, +2 carotid and radial pulses b/l, no carotid bruit  Abdomen: soft, nontender, nondistended, NABS  Extremities: no edema  Skin: no rash, warm, dry  Heme/Lymph: no bruising  Psych: organized thought, normal behavior and affect     Procedures Performed:           Consults:  Consults       No orders found from 8/15/2023 to 2023.            Pertinent Test Results:    Results from last 7 days   Lab Units 23  1639   SODIUM mmol/L 138   POTASSIUM mmol/L 4.3   CHLORIDE mmol/L 105   CO2 mmol/L 23.0   BUN mg/dL 31*   CREATININE mg/dL 1.01   GLUCOSE mg/dL 105*   CALCIUM mg/dL 9.3       Results from last 7 days   Lab Units 232 23  09/13/23  1639   HSTROP T ng/L 27* 23* 29*     Results from last 7 days   Lab Units 09/13/23  1639   WBC 10*3/mm3 9.64   HEMOGLOBIN g/dL 14.7   HEMATOCRIT % 42.4   PLATELETS 10*3/mm3 201                 Results from last 7 days   Lab Units 09/13/23  1639   PROBNP pg/mL 1,879.0*               Discharge Medications     Your medication list        START taking these medications        Instructions Last Dose Given Next Dose Due   acetaminophen 500 MG tablet  Commonly known as: TYLENOL      Take 2 tablets by mouth Every 8 (Eight) Hours As Needed for Mild Pain or Moderate Pain.       acetaminophen 500 MG tablet  Commonly known as: TYLENOL      Take 2 tablets by mouth Every 8 (Eight) Hours As Needed for Mild Pain.       amiodarone 200 MG tablet  Commonly known as: PACERONE  Start taking on: September 15, 2023      Take 2 tablets by mouth Every 12 (Twelve) Hours for 6 days, THEN 2 tablets Daily for 7 days, THEN 1 tablet Daily for 60 days.       Eliquis 5 MG tablet tablet  Generic drug: apixaban      Take 1 tablet by mouth Every 12 (Twelve) Hours. Indications: Atrial Fibrillation       losartan 25 MG tablet  Commonly known as: COZAAR      Take 1 tablet by mouth Daily.       metoprolol succinate XL 25 MG 24 hr tablet  Commonly known as: TOPROL-XL      Take 1 tablet by mouth Daily.       nicotine 14 MG/24HR patch  Commonly known as: NICODERM CQ      Place 1 patch on the skin as directed by provider Every Night.              CONTINUE taking these medications        Instructions Last Dose Given Next Dose Due   omeprazole 40 MG capsule  Commonly known as: priLOSEC      Take 1 capsule by mouth As Needed. Patient states he takes three times a day                 Where to Get Your Medications        These medications were sent to [place] [facility] [address]      Hours: Monday to Friday 7 AM to 6 PM, Saturday & Sunday 8 AM to 4:30 PM (Closed 12 PM to 12:30 PM) Phone: [phone]    acetaminophen 500 MG tablet  acetaminophen 500 MG tablet  amiodarone 200 MG tablet  Eliquis 5 MG tablet tablet  losartan 25 MG tablet  metoprolol succinate XL 25 MG 24 hr tablet  nicotine 14 MG/24HR patch           Discharge Diet:         Discharge disposition: Home    Discharge condition: Stable      Follow-up Appointments  No future appointments.   Follow-up Information       Provider, No Known .    Contact information:  [facility] [address] [phone]               James Archibald MD. Call.    Specialty: Cardiology  Contact information:  [address] [place] [address]  [facility] [id number] [phone]                               Test Results Pending at Discharge         Time:   I spent  < 30  minutes on this discharge activity which included: face-to-face encounter with the patient, reviewing the data in the system, coordination of the care with the nursing staff as well as consultants, documentation, and entering orders.        James Barton MD  [facility]  09/18/23  09:52 EDT

## 2023-09-25 ENCOUNTER — TELEPHONE (OUTPATIENT)
Dept: CARDIOLOGY | Facility: CLINIC | Age: 53
End: 2023-09-25

## 2023-09-25 NOTE — TELEPHONE ENCOUNTER
Caller: GRAYSON    Relationship to patient: SELF    Best call back number: [phone]    Chief complaint: CHEST PAIN    Type of visit: HOSPITAL FOLLOW UP    Requested date:     If rescheduling, when is the original appointment:     Additional notes: PATIENT WAS RELEASED FROM HOSPITAL ON 9.15.23 AND IS CALLING IN TO MAKE FOLLOW UP APPOINTMENT PER DISCHARGE PAPERS. NO TIMEFRAME ON PAPERS - PLEASE CALL PATIENT WITH FOLLOW UP APPOINTMENT.     PATIENT WOULD ALSO LIKE TO SWITCH OVER TO PROTONICS.    THANK YOU!

## 2023-10-06 ENCOUNTER — OFFICE VISIT (OUTPATIENT)
Age: 53
End: 2023-10-06

## 2023-10-06 VITALS
BODY MASS INDEX: 25.62 KG/M2 | WEIGHT: 179 LBS | SYSTOLIC BLOOD PRESSURE: 140 MMHG | OXYGEN SATURATION: 97 % | HEIGHT: 70 IN | DIASTOLIC BLOOD PRESSURE: 90 MMHG | HEART RATE: 68 BPM

## 2023-10-06 DIAGNOSIS — I10 PRIMARY HYPERTENSION: ICD-10-CM

## 2023-10-06 DIAGNOSIS — I50.22 CHRONIC HFREF (HEART FAILURE WITH REDUCED EJECTION FRACTION): Primary | ICD-10-CM

## 2023-10-06 DIAGNOSIS — I48.3 TYPICAL ATRIAL FLUTTER: ICD-10-CM

## 2023-10-06 RX ORDER — LOSARTAN POTASSIUM 50 MG/1
50 TABLET ORAL DAILY
Qty: 90 TABLET | Refills: 3 | Status: SHIPPED | OUTPATIENT
Start: 2023-10-06

## 2023-10-06 NOTE — PROGRESS NOTES
Subjective:     Encounter Date:10/06/2023      Patient ID: George Magill is a 53 y.o. male.    Chief Complaint:  Follow up after recent hospital visit for atrial flutter and new HFrEF    HPI:   53 y.o. male with GERD, recently diagnosed HFrEF and atrial flutter who presents for follow-up.  Patient presented to the urgent care on 9/13/23 with complaints of chest pain and was found to be in atrial flutter.  He underwent successful HERRERA cardioversion on 9/14/2023.  HERRERA revealed EF of 26 to 30% and this was believed to be secondary to tachycardia induced cardiomyopathy.  Given intermittent ectopy, he was started on amiodarone load and was discharged home on Eliquis 5 mg twice daily, Toprol 25 mg daily, and losartan 25 mg daily.  Since discharge, he notes that he has felt well, he denies any dyspnea exertion or chest pain.  He also denies any palpitations.         The following portions of the patient's history were reviewed and updated as appropriate: allergies, current medications, past family history, past medical history, past social history, past surgical history and problem list.     REVIEW OF SYSTEMS:   All systems reviewed.  Pertinent positives identified in HPI.  All other systems are negative.    Past Medical History:   Diagnosis Date    Asthma     childhood    GERD (gastroesophageal reflux disease)        Family History   Problem Relation Age of Onset    Prostate cancer Father        Social History     Socioeconomic History    Marital status: Single   Tobacco Use    Smoking status: Every Day     Packs/day: 1.00     Types: Cigarettes    Smokeless tobacco: Never    Tobacco comments:     ave one cigarette daily    Vaping Use    Vaping Use: Never used   Substance and Sexual Activity    Alcohol use: Yes     Comment: 1 drink per day average     Drug use: No       Allergies   Allergen Reactions    Asa [Aspirin] Hives and Swelling     Left eye swells        Past Surgical History:   Procedure Laterality Date     COLONOSCOPY W/ POLYPECTOMY N/A 11/20/2019    Procedure: COLONOSCOPY to cecum wit cold snare polypectomies;  Surgeon: Calvin Dacosta MD;  Location: [facility] ENDOSCOPY;  Service: Gastroenterology    ENDOSCOPY N/A 11/20/2019    Procedure: ESOPHAGOGASTRODUODENOSCOPY WITH BIOPSY;  Surgeon: Calvin Dacosta MD;  Location: [facility] ENDOSCOPY;  Service: Gastroenterology    WISDOM TOOTH EXTRACTION           ECG 12 Lead    Date/Time: 10/6/2023 12:16 PM  Performed by: James Archibald MD  Authorized by: James Archibald MD   Comparison: compared with previous ECG from 9/14/2023  Similar to previous ECG  Rhythm: sinus rhythm  Rate: normal  Conduction: conduction normal  QRS axis: normal  Other findings: non-specific ST-T wave changes    Clinical impression: non-specific ECG           Objective:         Vitals:    10/06/23 1200   BP: 140/90   Pulse: 68   SpO2: 97%       PHYSICAL EXAM:  GEN: well appearing, in NAD   HEENT: NCAT, EOMI, moist mucus membranes   Respiratory: CTAB, no wheezes, rales or rhonchi  CV: normal rate, regular rhythm, normal S1, S2, no murmurs, rubs, gallops, +2 radial pulses b/l  GI: soft, nontender, nondistended  MSK: no edema  Skin: no rash, warm, dry  Heme/Lymph: no bruising or bleeding  Neuro: Alert and Oriented x 3, grossly normal motor function        Assessment:         (I50.22) Chronic HFrEF (heart failure with reduced ejection fraction) - Plan: Adult Transthoracic Echo Complete w/ Color, Spectral and Contrast if Necessary Per Protocol    (I48.3) Typical atrial flutter    53 y.o. male with GERD, recently diagnosed HFrEF and atrial flutter who presents for follow-up.       Plan:       #HFrEF  Well compensated. NYHA 1.  Possibly tachycardia and his cardiomyopathy given atrial flutter upon diagnosis.  We will recheck ultrasound in 3 months, if LV function has improved, will likely refer to EP for definitive therapy for flutter.  If EF has not improved, will pursue left heart catheterization for ischemic  evaluation.  - continue Toprol 25mg daily  - increase losartan to 50mg daily  - will recheck TTE in 3 months    #Atrial flutter  Currently on amiodarone which we will continue for 3 months.  -Continue amiodarone 200 mg daily, apixaban 5 mg twice daily    #Hypertension  Currently above goal with diastolics in the 90s.  -Increase losartan to 50 mg daily as above      I will see the patient again in the office in 6 weeks or earlier as needed.         James Archibald MD  10/06/23  [facility]    Outpatient Encounter Medications as of 10/6/2023   Medication Sig Dispense Refill    acetaminophen (TYLENOL) 500 MG tablet Take 2 tablets by mouth Every 8 (Eight) Hours As Needed for Mild Pain. 30 tablet 0    amiodarone (PACERONE) 200 MG tablet Take 2 tablets by mouth Every 12 (Twelve) Hours for 6 days, THEN 2 tablets Daily for 7 days, THEN 1 tablet Daily for 60 days. 98 tablet 0    apixaban (ELIQUIS) 5 MG tablet tablet Take 1 tablet by mouth Every 12 (Twelve) Hours. Indications: Atrial Fibrillation 60 tablet 11    losartan (COZAAR) 50 MG tablet Take 1 tablet by mouth Daily. 90 tablet 3    metoprolol succinate XL (TOPROL-XL) 25 MG 24 hr tablet Take 1 tablet by mouth Daily. 90 tablet 3    nicotine (NICODERM CQ) 14 MG/24HR patch Place 1 patch on the skin as directed by provider Every Night. 28 patch 11    omeprazole (priLOSEC) 40 MG capsule Take 1 capsule by mouth As Needed. Patient states he takes three times a day      [DISCONTINUED] losartan (COZAAR) 25 MG tablet Take 1 tablet by mouth Daily. 90 tablet 3    [DISCONTINUED] acetaminophen (TYLENOL) 500 MG tablet Take 2 tablets by mouth Every 8 (Eight) Hours As Needed for Mild Pain or Moderate Pain. 30 tablet 0     No facility-administered encounter medications on file as of 10/6/2023.

## 2023-10-06 NOTE — PROGRESS NOTES
Subjective:     Encounter Date:10/06/2023      Patient ID: George Magill is a 53 y.o. male.    Chief Complaint:  ***    HPI:   53 y.o. male ***    The following portions of the patient's history were reviewed and updated as appropriate: allergies, current medications, past family history, past medical history, past social history, past surgical history and problem list.     REVIEW OF SYSTEMS:   All systems reviewed.  Pertinent positives identified in HPI.  All other systems are negative.    Past Medical History:   Diagnosis Date    Asthma     childhood    GERD (gastroesophageal reflux disease)        Family History   Problem Relation Age of Onset    Prostate cancer Father        Social History     Socioeconomic History    Marital status: Single   Tobacco Use    Smoking status: Every Day     Packs/day: 1.00     Types: Cigarettes    Smokeless tobacco: Never    Tobacco comments:     ave one cigarette daily    Vaping Use    Vaping Use: Never used   Substance and Sexual Activity    Alcohol use: Yes     Comment: 1 drink per day average     Drug use: No       Allergies   Allergen Reactions    Asa [Aspirin] Hives and Swelling     Left eye swells        Past Surgical History:   Procedure Laterality Date    COLONOSCOPY W/ POLYPECTOMY N/A 11/20/2019    Procedure: COLONOSCOPY to cecum wit cold snare polypectomies;  Surgeon: Calvin Dacosta MD;  Location: [facility] ENDOSCOPY;  Service: Gastroenterology    ENDOSCOPY N/A 11/20/2019    Procedure: ESOPHAGOGASTRODUODENOSCOPY WITH BIOPSY;  Surgeon: Calvin Dacosta MD;  Location: [facility] ENDOSCOPY;  Service: Gastroenterology    WISDOM TOOTH EXTRACTION         Procedures       Objective:         Vitals:    10/06/23 1200   BP: 140/90   Pulse: 68   SpO2: 97%       PHYSICAL EXAM:  GEN: well appearing, in NAD   HEENT: NCAT, EOMI, moist mucus membranes   Respiratory: CTAB, no wheezes, rales or rhonchi  CV: normal rate, regular rhythm, normal S1, S2, no murmurs, rubs, gallops,  +2 radial pulses b/l  GI: soft, nontender, nondistended  MSK: no edema  Skin: no rash, warm, dry  Heme/Lymph: no bruising or bleeding  Neuro: Alert and Oriented x 3, grossly normal motor function        Assessment:         No diagnosis found.       Plan:           *** thank you very much for referring this kind patient to me. Please call me with any questions or concerns. I will see the patient again in the office {followup:[id number]}         James Archibald MD  10/06/23  [facility]    Outpatient Encounter Medications as of 10/6/2023   Medication Sig Dispense Refill    acetaminophen (TYLENOL) 500 MG tablet Take 2 tablets by mouth Every 8 (Eight) Hours As Needed for Mild Pain. 30 tablet 0    amiodarone (PACERONE) 200 MG tablet Take 2 tablets by mouth Every 12 (Twelve) Hours for 6 days, THEN 2 tablets Daily for 7 days, THEN 1 tablet Daily for 60 days. 98 tablet 0    apixaban (ELIQUIS) 5 MG tablet tablet Take 1 tablet by mouth Every 12 (Twelve) Hours. Indications: Atrial Fibrillation 60 tablet 11    losartan (COZAAR) 25 MG tablet Take 1 tablet by mouth Daily. 90 tablet 3    metoprolol succinate XL (TOPROL-XL) 25 MG 24 hr tablet Take 1 tablet by mouth Daily. 90 tablet 3    nicotine (NICODERM CQ) 14 MG/24HR patch Place 1 patch on the skin as directed by provider Every Night. 28 patch 11    omeprazole (priLOSEC) 40 MG capsule Take 1 capsule by mouth As Needed. Patient states he takes three times a day      [DISCONTINUED] acetaminophen (TYLENOL) 500 MG tablet Take 2 tablets by mouth Every 8 (Eight) Hours As Needed for Mild Pain or Moderate Pain. 30 tablet 0     No facility-administered encounter medications on file as of 10/6/2023.

## 2023-11-21 ENCOUNTER — TRANSCRIBE ORDERS (OUTPATIENT)
Dept: CARDIOLOGY | Facility: CLINIC | Age: 53
End: 2023-11-21
Payer: MEDICAID

## 2023-11-21 ENCOUNTER — OFFICE VISIT (OUTPATIENT)
Age: 53
End: 2023-11-21
Payer: MEDICAID

## 2023-11-21 VITALS
HEIGHT: 70 IN | HEART RATE: 68 BPM | OXYGEN SATURATION: 97 % | DIASTOLIC BLOOD PRESSURE: 90 MMHG | WEIGHT: 179 LBS | SYSTOLIC BLOOD PRESSURE: 144 MMHG | BODY MASS INDEX: 25.62 KG/M2

## 2023-11-21 DIAGNOSIS — Z13.6 SCREENING FOR ISCHEMIC HEART DISEASE: ICD-10-CM

## 2023-11-21 DIAGNOSIS — I48.3 TYPICAL ATRIAL FLUTTER: ICD-10-CM

## 2023-11-21 DIAGNOSIS — I10 PRIMARY HYPERTENSION: ICD-10-CM

## 2023-11-21 DIAGNOSIS — Z01.810 PRE-OPERATIVE CARDIOVASCULAR EXAMINATION: Primary | ICD-10-CM

## 2023-11-21 DIAGNOSIS — I50.22 CHRONIC HFREF (HEART FAILURE WITH REDUCED EJECTION FRACTION): Primary | ICD-10-CM

## 2023-11-21 DIAGNOSIS — R06.09 DOE (DYSPNEA ON EXERTION): ICD-10-CM

## 2023-11-21 RX ORDER — SACUBITRIL AND VALSARTAN 49; 51 MG/1; MG/1
1 TABLET, FILM COATED ORAL 2 TIMES DAILY
Qty: 60 TABLET | Refills: 11 | Status: SHIPPED | OUTPATIENT
Start: 2023-11-21

## 2023-11-21 RX ORDER — SPIRONOLACTONE 25 MG/1
25 TABLET ORAL DAILY
Qty: 90 TABLET | Refills: 3 | Status: SHIPPED | OUTPATIENT
Start: 2023-11-21

## 2023-11-21 RX ORDER — AMIODARONE HYDROCHLORIDE 200 MG/1
200 TABLET ORAL DAILY
Qty: 30 TABLET | Refills: 0 | Status: SHIPPED | OUTPATIENT
Start: 2023-11-21 | End: 2023-12-21

## 2023-11-21 RX ORDER — METOPROLOL SUCCINATE 25 MG/1
25 TABLET, EXTENDED RELEASE ORAL DAILY
Qty: 90 TABLET | Refills: 3 | Status: SHIPPED | OUTPATIENT
Start: 2023-11-21

## 2023-11-21 NOTE — PROGRESS NOTES
"    Subjective:     Encounter Date:11/21/2023      Patient ID: George Magill is a 53 y.o. male.    Chief Complaint:  Follow up of HFrEF    HPI:   53 y.o. male with GERD, recently diagnosed HFrEF and atrial flutter who presents for follow-up.  Patient was last seen in October and at the time we continued uptitration of his GDMT for HFrEF.  Since then, he notes that he has been a bit little bit depressed because he feels like he is very out of shape.  When asked to clarify, he notes that he has significant dyspnea on exertion if he tries to walk long distance or go up multiple flights of stairs.  He has continued to do so in order to \"push himself to get into shape.\"  He is not had any further episodes of palpitations or chest pain.  He denies any lower extremity edema.  He notes that he recently had a cold.    Per prior note:  Patient presented to the urgent care on 9/13/23 with complaints of chest pain and was found to be in atrial flutter.  He underwent successful HERRERA cardioversion on 9/14/2023.  HERRERA revealed EF of 26 to 30% and this was believed to be secondary to tachycardia induced cardiomyopathy.  Given intermittent ectopy, he was started on amiodarone load and was discharged home on Eliquis 5 mg twice daily, Toprol 25 mg daily, and losartan 25 mg daily.  Since discharge, he notes that he has felt well, he denies any dyspnea exertion or chest pain.  He also denies any palpitations.         The following portions of the patient's history were reviewed and updated as appropriate: allergies, current medications, past family history, past medical history, past social history, past surgical history and problem list.     REVIEW OF SYSTEMS:   All systems reviewed.  Pertinent positives identified in HPI.  All other systems are negative.    Past Medical History:   Diagnosis Date    Asthma     childhood    GERD (gastroesophageal reflux disease)        Family History   Problem Relation Age of Onset    Prostate cancer " Father        Social History     Socioeconomic History    Marital status: Single   Tobacco Use    Smoking status: Every Day     Packs/day: 1     Types: Cigarettes    Smokeless tobacco: Never    Tobacco comments:     ave one cigarette daily    Vaping Use    Vaping Use: Never used   Substance and Sexual Activity    Alcohol use: Yes     Comment: 1 drink per day average     Drug use: No       Allergies   Allergen Reactions    Asa [Aspirin] Hives and Swelling     Left eye swells        Past Surgical History:   Procedure Laterality Date    COLONOSCOPY W/ POLYPECTOMY N/A 11/20/2019    Procedure: COLONOSCOPY to cecum wit cold snare polypectomies;  Surgeon: Calvin Dacosta MD;  Location: [facility] ENDOSCOPY;  Service: Gastroenterology    ENDOSCOPY N/A 11/20/2019    Procedure: ESOPHAGOGASTRODUODENOSCOPY WITH BIOPSY;  Surgeon: Calvin Dacosta MD;  Location: [facility] ENDOSCOPY;  Service: Gastroenterology    WISDOM TOOTH EXTRACTION           ECG 12 Lead    Date/Time: 11/21/2023 12:31 PM  Performed by: James Archibald MD    Authorized by: James Archibald MD  Comparison: compared with previous ECG from 11/21/2023  Similar to previous ECG  Rhythm: sinus rhythm  Rate: normal  QRS axis: normal  Other findings: non-specific ST-T wave changes    Clinical impression: non-specific ECG             Objective:         Vitals:    11/21/23 1047   BP: 144/90   Pulse: 68   SpO2: 97%       PHYSICAL EXAM:  GEN: well appearing, in NAD   HEENT: NCAT, EOMI, moist mucus membranes   Respiratory: Diffuse wheezing  CV: normal rate, regular rhythm, normal S1, S2, no murmurs, rubs, gallops, +2 radial pulses b/l  GI: soft, nontender, nondistended  MSK: no edema  Skin: no rash, warm, dry  Heme/Lymph: no bruising or bleeding  Neuro: Alert and Oriented x 3, grossly normal motor function        Assessment:         (I50.22) Chronic HFrEF (heart failure with reduced ejection fraction) - Plan: Case Request Cath Lab: Right and Left Heart Cath    (R06.09) YANG  (dyspnea on exertion) - Plan: Case Request Cath Lab: Right and Left Heart Cath    (I48.3) Typical atrial flutter    (I10) Primary hypertension    53 y.o. male with GERD, recently diagnosed HFrEF and atrial flutter who presents for follow-up.       Plan:       #HFrEF  NYHA II.  Possibly tachycardia induced cardiomyopathy given atrial flutter upon diagnosis.  His symptoms may be secondary to his HFrEF though we have not evaluate his coronaries as we had been treating for presumed tachycardia and his cardiomyopathy.  ED may be playing a role in both his cardiomyopathy and his symptoms and discussed pursuing right and left heart catheterization for further evaluation.  - continue Toprol 25mg daily  - Switch losartan to Entresto 49/51 mg twice daily  - Start spironolactone 25 mg daily  - Start Jardiance 10 mg daily  -Right and left heart catheterization next week    #Dyspnea on exertion  As above, may be secondary to HFrEF though CAD has not been ruled out.  -Left heart catheterization    #Atrial flutter  Currently on amiodarone which we will continue for 3 months.  -Continue amiodarone 200 mg daily entheses 1 more month), apixaban 5 mg twice daily    #Hypertension  Currently above goal with diastolics in the 90s.  -Switch losartan to Entresto as above, start    I will see the patient again in the office in 6 weeks or earlier as needed.         James Archibald MD  11/21/23  [facility]    Outpatient Encounter Medications as of 11/21/2023   Medication Sig Dispense Refill    acetaminophen (TYLENOL) 500 MG tablet Take 2 tablets by mouth Every 8 (Eight) Hours As Needed for Mild Pain. 30 tablet 0    amiodarone (PACERONE) 200 MG tablet Take 1 tablet by mouth Daily for 30 days. 30 tablet 0    apixaban (ELIQUIS) 5 MG tablet tablet Take 1 tablet by mouth Every 12 (Twelve) Hours. Indications: Atrial Fibrillation 60 tablet 11    metoprolol succinate XL (TOPROL-XL) 25 MG 24 hr tablet Take 1 tablet by mouth Daily. 90  tablet 3    omeprazole (priLOSEC) 40 MG capsule Take 1 capsule by mouth As Needed. Patient states he takes three times a day      [DISCONTINUED] amiodarone (PACERONE) 200 MG tablet Take 2 tablets by mouth Every 12 (Twelve) Hours for 6 days, THEN 2 tablets Daily for 7 days, THEN 1 tablet Daily for 60 days. 98 tablet 0    [DISCONTINUED] metoprolol succinate XL (TOPROL-XL) 25 MG 24 hr tablet Take 1 tablet by mouth Daily. 90 tablet 3    empagliflozin (Jardiance) 10 MG tablet tablet Take 1 tablet by mouth Daily. 90 tablet 3    sacubitril-valsartan (Entresto) 49-51 MG tablet Take 1 tablet by mouth 2 (Two) Times a Day. 60 tablet 11    spironolactone (ALDACTONE) 25 MG tablet Take 1 tablet by mouth Daily. 90 tablet 3    [DISCONTINUED] apixaban (ELIQUIS) 5 MG tablet tablet Take 1 tablet by mouth Every 12 (Twelve) Hours. Indications: Atrial Fibrillation (Patient not taking: Reported on 11/21/2023) 60 tablet 11    [DISCONTINUED] losartan (COZAAR) 50 MG tablet Take 1 tablet by mouth Daily. (Patient not taking: Reported on 11/21/2023) 90 tablet 3    [DISCONTINUED] nicotine (NICODERM CQ) 14 MG/24HR patch Place 1 patch on the skin as directed by provider Every Night. 28 patch 11     No facility-administered encounter medications on file as of 11/21/2023.

## 2023-11-22 ENCOUNTER — LAB (OUTPATIENT)
Dept: LAB | Facility: HOSPITAL | Age: 53
End: 2023-11-22
Payer: MEDICAID

## 2023-11-22 ENCOUNTER — TELEPHONE (OUTPATIENT)
Dept: CARDIOLOGY | Facility: CLINIC | Age: 53
End: 2023-11-22
Payer: MEDICAID

## 2023-11-22 DIAGNOSIS — Z13.6 SCREENING FOR ISCHEMIC HEART DISEASE: ICD-10-CM

## 2023-11-22 DIAGNOSIS — Z01.810 PRE-OPERATIVE CARDIOVASCULAR EXAMINATION: ICD-10-CM

## 2023-11-22 LAB
ANION GAP SERPL CALCULATED.3IONS-SCNC: 9 MMOL/L (ref 5–15)
BASOPHILS # BLD AUTO: 0.07 10*3/MM3 (ref 0–0.2)
BASOPHILS NFR BLD AUTO: 0.9 % (ref 0–1.5)
BUN SERPL-MCNC: 21 MG/DL (ref 6–20)
BUN/CREAT SERPL: 13.3 (ref 7–25)
CALCIUM SPEC-SCNC: 9.7 MG/DL (ref 8.6–10.5)
CHLORIDE SERPL-SCNC: 102 MMOL/L (ref 98–107)
CO2 SERPL-SCNC: 28 MMOL/L (ref 22–29)
CREAT SERPL-MCNC: 1.58 MG/DL (ref 0.76–1.27)
DEPRECATED RDW RBC AUTO: 45.3 FL (ref 37–54)
EGFRCR SERPLBLD CKD-EPI 2021: 52.3 ML/MIN/1.73
EOSINOPHIL # BLD AUTO: 0.26 10*3/MM3 (ref 0–0.4)
EOSINOPHIL NFR BLD AUTO: 3.2 % (ref 0.3–6.2)
ERYTHROCYTE [DISTWIDTH] IN BLOOD BY AUTOMATED COUNT: 14 % (ref 12.3–15.4)
GLUCOSE SERPL-MCNC: 112 MG/DL (ref 65–99)
HCT VFR BLD AUTO: 53 % (ref 37.5–51)
HGB BLD-MCNC: 18.2 G/DL (ref 13–17.7)
IMM GRANULOCYTES # BLD AUTO: 0.04 10*3/MM3 (ref 0–0.05)
IMM GRANULOCYTES NFR BLD AUTO: 0.5 % (ref 0–0.5)
LYMPHOCYTES # BLD AUTO: 2.23 10*3/MM3 (ref 0.7–3.1)
LYMPHOCYTES NFR BLD AUTO: 27.3 % (ref 19.6–45.3)
MCH RBC QN AUTO: 30.6 PG (ref 26.6–33)
MCHC RBC AUTO-ENTMCNC: 34.3 G/DL (ref 31.5–35.7)
MCV RBC AUTO: 89.2 FL (ref 79–97)
MONOCYTES # BLD AUTO: 0.49 10*3/MM3 (ref 0.1–0.9)
MONOCYTES NFR BLD AUTO: 6 % (ref 5–12)
NEUTROPHILS NFR BLD AUTO: 5.07 10*3/MM3 (ref 1.7–7)
NEUTROPHILS NFR BLD AUTO: 62.1 % (ref 42.7–76)
NRBC BLD AUTO-RTO: 0 /100 WBC (ref 0–0.2)
PLATELET # BLD AUTO: 278 10*3/MM3 (ref 140–450)
PMV BLD AUTO: 9.9 FL (ref 6–12)
POTASSIUM SERPL-SCNC: 4.4 MMOL/L (ref 3.5–5.2)
RBC # BLD AUTO: 5.94 10*6/MM3 (ref 4.14–5.8)
SODIUM SERPL-SCNC: 139 MMOL/L (ref 136–145)
WBC NRBC COR # BLD AUTO: 8.16 10*3/MM3 (ref 3.4–10.8)

## 2023-11-22 PROCEDURE — 80048 BASIC METABOLIC PNL TOTAL CA: CPT

## 2023-11-22 PROCEDURE — 36415 COLL VENOUS BLD VENIPUNCTURE: CPT

## 2023-11-22 PROCEDURE — 85025 COMPLETE CBC W/AUTO DIFF WBC: CPT

## 2023-11-22 NOTE — TELEPHONE ENCOUNTER
Pt was seen yesterday. You started him on Entresto 49-51 MG.    He states once he took the entresto it made him nausea and vomiting. He is wanting to know if entresto could be the cause? Can he go back on losartan? Is it ok if he doesn't take anything? Pt walked in to the office.    I am leaving for the day so I will not see your response until Monday if you send this back to me.    PT#: [phone]

## 2024-01-08 ENCOUNTER — TELEPHONE (OUTPATIENT)
Dept: CARDIOLOGY | Facility: CLINIC | Age: 54
End: 2024-01-08

## 2024-01-18 ENCOUNTER — TELEPHONE (OUTPATIENT)
Dept: CARDIOLOGY | Facility: CLINIC | Age: 54
End: 2024-01-18

## 2025-02-13 ENCOUNTER — TELEPHONE (OUTPATIENT)
Dept: CARDIOLOGY | Facility: CLINIC | Age: 55
End: 2025-02-13
Payer: COMMERCIAL

## 2025-02-13 NOTE — TELEPHONE ENCOUNTER
Eye surgery office called because pt was there for a retinal surgery when it was discovered his HR was 147.  Pt has a h/o atrial flutter.  They are now requesting for cardiac clearance.  I added him on for an appt with IP tomorrow morning.    I s/w pt who says he noticed that his HR has been elevated since yesterday. He wasn't aware it was this high.  Staff at office gave me VS of 132/100,  and 144/105, .  Pt denies feeling dizzy or lightheaded, says he feels fine.  Pt tells me he ran out of metoprolol and hasn't taken this for about 2 weeks.  Pt also isn't taking apixaban as he ran out of this as well.  LOV 11/2023 and had 2 FU appts afterwards that pt cancelled.  He has been taking Entresto.    Pt says he's been under a lot of stress.  He had bronchitis, the flu, and pna about 2 months ago.  2 weeks ago pt has the stomach flu.    Do you have any recommendations for this patient until he sees you tomorrow morning?    Thank you,    Elin WRIGHT RN  [facility] [place] Triage  02/13/25  12:51 EST

## 2025-02-19 ENCOUNTER — OFFICE VISIT (OUTPATIENT)
Dept: CARDIOLOGY | Facility: CLINIC | Age: 55
End: 2025-02-19
Payer: COMMERCIAL

## 2025-02-19 VITALS
WEIGHT: 192 LBS | SYSTOLIC BLOOD PRESSURE: 120 MMHG | HEART RATE: 123 BPM | HEIGHT: 70 IN | BODY MASS INDEX: 27.49 KG/M2 | DIASTOLIC BLOOD PRESSURE: 88 MMHG

## 2025-02-19 DIAGNOSIS — I48.3 TYPICAL ATRIAL FLUTTER: Primary | ICD-10-CM

## 2025-02-19 DIAGNOSIS — Z01.810 PRE-OPERATIVE CARDIOVASCULAR EXAMINATION: ICD-10-CM

## 2025-02-19 DIAGNOSIS — I50.22 CHRONIC HFREF (HEART FAILURE WITH REDUCED EJECTION FRACTION): ICD-10-CM

## 2025-02-19 RX ORDER — IBUPROFEN 100 MG/5ML
SUSPENSION ORAL EVERY 6 HOURS PRN
COMMUNITY

## 2025-02-19 RX ORDER — METOPROLOL SUCCINATE 25 MG/1
25 TABLET, EXTENDED RELEASE ORAL DAILY
Qty: 90 TABLET | Refills: 3 | Status: SHIPPED | OUTPATIENT
Start: 2025-02-19

## 2025-02-19 RX ORDER — SACUBITRIL AND VALSARTAN 49; 51 MG/1; MG/1
1 TABLET, FILM COATED ORAL 2 TIMES DAILY
Qty: 60 TABLET | Refills: 11 | Status: SHIPPED | OUTPATIENT
Start: 2025-02-19

## 2025-02-19 NOTE — PROGRESS NOTES
Subjective:     Encounter Date:02/19/2025      Patient ID: George R Magill is a 54 y.o. male.    Chief Complaint:  Follow up of HFrEF, atrial flutter, preoperative cardiovascular examination    HPI:   54 y.o. male with GERD, HFrEF (EF 26 to 30%) and atrial flutter who was lost to follow-up, last seen in November 2023, presents for follow-up and for preoperative cardiovascular examination. Patient was undergoing preop testing for retinal surgery and his heart rate was noted to be in the 140s so cardiac clearance was requested. Patient had apparently run out of his metoprolol and has also not been taking apixaban.  He notes that he had the flu not too long ago and was blaming feeling lousy on some of his medications.  He has not been on Jardiance, spironolactone, or Eliquis for quite some time.  He denies any symptoms from his atrial flutter.  He states that he has been more active than when I last saw him with only mild symptoms.  He states compliance to metoprolol and notes that he is taking Entresto once daily.    Of note, plan during her last visit was to pursue right and left heart catheterization given patient's symptoms as well as evaluation for cause of his cardiomyopathy.  Patient subsequently canceled multiple office visits.      Per prior note:  Patient presented to the urgent care on 9/13/23 with complaints of chest pain and was found to be in atrial flutter.  He underwent successful HERRERA cardioversion on 9/14/2023.  HERRERA revealed EF of 26 to 30% and this was believed to be secondary to tachycardia induced cardiomyopathy.  Given intermittent ectopy, he was started on amiodarone load and was discharged home on Eliquis 5 mg twice daily, Toprol 25 mg daily, and losartan 25 mg daily.  Since discharge, he notes that he has felt well, he denies any dyspnea exertion or chest pain.  He also denies any palpitations.         The following portions of the patient's history were reviewed and updated as appropriate:  allergies, current medications, past family history, past medical history, past social history, past surgical history and problem list.     REVIEW OF SYSTEMS:   All systems reviewed.  Pertinent positives identified in HPI.  All other systems are negative.    Past Medical History:   Diagnosis Date    Abnormal ECG 2023    Asthma     childhood    Atrial fibrillation 2023    GERD (gastroesophageal reflux disease)     Hypertension 2023       Family History   Problem Relation Age of Onset    Prostate cancer Father     Heart attack Father     Heart disease Father     Heart failure Father        Social History     Socioeconomic History    Marital status: Single   Tobacco Use    Smoking status: Former     Current packs/day: 0.00     Average packs/day: 1 pack/day for 33.2 years (33.2 ttl pk-yrs)     Types: Cigarettes     Start date: 1990     Quit date: 2023     Years since quittin.3     Passive exposure: Never    Smokeless tobacco: Never    Tobacco comments:     ave one cigarette daily    Vaping Use    Vaping status: Never Used   Substance and Sexual Activity    Alcohol use: Not Currently     Alcohol/week: 1.0 standard drink of alcohol     Types: 1 Glasses of wine per week     Comment:  for 30+ years. Chose marijuana over alcohol.ww,    Drug use: Not Currently     Types: Cocaine(coke), Marijuana     Comment: No coke for 2+years. Still smoke marijuana occasionally ( mo    Sexual activity: Not Currently     Partners: Female     Birth control/protection: Condom, Coitus interruptus, None     Comment: Used Cialis and viagra       Allergies   Allergen Reactions    Asa [Aspirin] Hives and Swelling     Left eye swells        Past Surgical History:   Procedure Laterality Date    ABLATION OF DYSRHYTHMIC FOCUS      COLONOSCOPY W/ POLYPECTOMY N/A 2019    Procedure: COLONOSCOPY to cecum wit cold snare polypectomies;  Surgeon: Calvin Dacosta MD;  Location: [facility] ENDOSCOPY;   Service: Gastroenterology    ENDOSCOPY N/A 11/20/2019    Procedure: ESOPHAGOGASTRODUODENOSCOPY WITH BIOPSY;  Surgeon: Calvin Dacosta MD;  Location: [facility] ENDOSCOPY;  Service: Gastroenterology    WISDOM TOOTH EXTRACTION           ECG 12 Lead    Date/Time: 2/19/2025 11:37 AM  Performed by: James Archibald MD    Authorized by: James Archibald MD  Comparison: compared with previous ECG from 11/21/2023  Comparison to previous ECG: Previously sinus  Rhythm: atrial flutter  Rate: tachycardic  QRS axis: right  Other findings: non-specific ST-T wave changes    Clinical impression: abnormal EKG             Objective:         Vitals:    02/19/25 1111   BP: 120/88   Pulse: (!) 123         PHYSICAL EXAM:  GEN: well appearing, in NAD   HEENT: NCAT, EOMI, moist mucus membranes   Respiratory: Diffuse wheezing  CV: Tachycardic rate, irregularly irregular rhythm, normal S1, S2, no murmurs, rubs, gallops, +2 radial pulses b/l  GI: soft, nontender, nondistended  MSK: no edema  Skin: no rash, warm, dry  Heme/Lymph: no bruising or bleeding  Neuro: Alert and Oriented x 3, grossly normal motor function        Assessment:         (I48.3) Typical atrial flutter - Plan: Ambulatory Referral to Cardiac Electrophysiology    (I50.22) Chronic HFrEF (heart failure with reduced ejection fraction) - Plan: Adult Transthoracic Echo Complete w/ Color, Spectral and Contrast if Necessary Per Protocol    (Z01.810) Pre-operative cardiovascular examination    54 y.o. male with GERD, HFrEF (EF 26 to 30%) and atrial flutter who was lost to follow-up, last seen in November 2023, presents for follow-up and for preoperative cardiovascular examination.       Plan:       #HFrEF  NYHA II.  EF 26 to 30% on last check.  Differential included tachycardia induced cardiomyopathy given atrial flutter.  He underwent successful HERRERA cardioversion but unfortunately was subsequently lost to follow-up.  Plan was to obtain right and left heart catheterization for further  evaluation at that time but due to loss of follow-up this was not performed.  He has been nonadherent to some of his medications.  He appears well compensated on exam.  - continue Toprol 25mg daily, Entresto 49/51 mg twice daily  - Okay to hold off on spironolactone and Jardiance for now  - Echocardiogram for reevaluation of EF    #Atrial flutter  Status post successful HERRERA cardioversion in September 2023.  Unfortunately patient was lost to follow-up after November and we never had a chance to reevaluate echocardiogram.  He is back in atrial flutter at this time.  Asymptomatic.  Heart rates appear to be better controlled than what was reported during his prior preop testing.  - We discussed another attempt at HERRERA/DCCV though this is not urgent as he is well compensated and not very tachycardic.  We also discussed referral to EP for possible other therapies.  Will refer to EP for further evaluation.  - Stressed importance of Eliquis adherence, he can start taking it once able to from a surgical standpoint.  - Continue Toprol and restart Eliquis as above    #Preoperative cardiovascular examination  Patient with retinal detachment and scheduled to undergo retinal surgery.  He states this will be done under conscious sedation.  Despite his HFrEF and atrial flutter, he is well compensated and at this time I think he can proceed with surgery without any further cardiac testing or intervention.     I will see the patient again in the office in 1 month or earlier as needed.         James Archibald MD  02/19/25  [facility]    Outpatient Encounter Medications as of 2/19/2025   Medication Sig Dispense Refill    ibuprofen (ADVIL,MOTRIN) 100 MG/5ML suspension Take  by mouth Every 6 (Six) Hours As Needed for Mild Pain.      metoprolol succinate XL (TOPROL-XL) 25 MG 24 hr tablet Take 1 tablet by mouth Daily. 90 tablet 3    omeprazole (priLOSEC) 40 MG capsule Take 1 capsule by mouth As Needed. Patient states he takes  three times a day      sacubitril-valsartan (Entresto) 49-51 MG tablet Take 1 tablet by mouth 2 (Two) Times a Day. 60 tablet 11    [DISCONTINUED] apixaban (ELIQUIS) 5 MG tablet tablet Take 1 tablet by mouth Every 12 (Twelve) Hours. Indications: Atrial Fibrillation 60 tablet 11    [DISCONTINUED] metoprolol succinate XL (TOPROL-XL) 25 MG 24 hr tablet Take 1 tablet by mouth Daily. 90 tablet 3    [DISCONTINUED] sacubitril-valsartan (Entresto) 49-51 MG tablet Take 1 tablet by mouth 2 (Two) Times a Day. 60 tablet 11    [DISCONTINUED] acetaminophen (TYLENOL) 500 MG tablet Take 2 tablets by mouth Every 8 (Eight) Hours As Needed for Mild Pain. 30 tablet 0    [DISCONTINUED] apixaban (ELIQUIS) 5 MG tablet tablet Take 1 tablet by mouth Every 12 (Twelve) Hours. Indications: Atrial Fibrillation (Patient taking differently: Take 11 mg by mouth Every 12 (Twelve) Hours. Indications: Atrial Fibrillation) 60 tablet 11    [DISCONTINUED] empagliflozin (Jardiance) 10 MG tablet tablet Take 1 tablet by mouth Daily. 90 tablet 3    [DISCONTINUED] spironolactone (ALDACTONE) 25 MG tablet Take 1 tablet by mouth Daily. 90 tablet 3     No facility-administered encounter medications on file as of 2/19/2025.

## 2025-02-20 ENCOUNTER — TELEPHONE (OUTPATIENT)
Dept: CARDIOLOGY | Facility: CLINIC | Age: 55
End: 2025-02-20
Payer: COMMERCIAL

## 2025-02-20 NOTE — TELEPHONE ENCOUNTER
Jc with the [facility] called about this pt with a retinal detachment.  She's requesting clearance for surgery.  Dr. Archibald's LOV note mentions that he's clear.  I faxed this to her at [phone].    Thank you,    Elin WRIGHT RN  [place] [facility]  02/20/25 09:29 EST

## 2025-03-25 ENCOUNTER — TELEPHONE (OUTPATIENT)
Dept: CARDIOLOGY | Age: 55
End: 2025-03-25

## 2025-03-25 NOTE — TELEPHONE ENCOUNTER
Caller: Magill, George R    Relationship to patient: Self    Best call back number: [phone]     Patient is needing: PT IS GOING TO SEE DR DIANA STALEY AT [facility] FOR PRIMARY CARE FOR THE FIRST TIME AND PT IS REQUESTING HIS RECORDS FROM DR JAMES'S OFFICE TO BE FAXED OVER TO DR NATHAN. PHONE #- [phone], PT DID NOT HAVE FAX #.

## 2025-03-27 ENCOUNTER — TELEPHONE (OUTPATIENT)
Dept: CARDIOLOGY | Age: 55
End: 2025-03-27

## 2025-03-27 NOTE — TELEPHONE ENCOUNTER
Caller Name: Magill, George SHAWN  Relationship: Self  Best Contact Number: [phone]    Patient is requesting samples of ENTRESTO 49-51 MG  How many days of medication do you have left? 2 PILLS LEFT    Additional Information:

## 2025-03-27 NOTE — TELEPHONE ENCOUNTER
Caller: Magill, George R    Relationship to patient: Self    Best call back number: [phone]    Additional notes: PT IS CALLING TO SCHEDULE A 1 MONTH  FU WITH DR. JAMES. HE SAID WE SAW HIM WITH HIS CURRENT INSURANCE WHICH WE DON'T ACCEPT AND THAT HE WOULD BE SELF PAY. SCHEDULED PT FOR 4/18, PLEASE ADVISE IF PT'S APPT NEEDS TO BE CANCELLED

## 2025-03-28 RX ORDER — LOSARTAN POTASSIUM 50 MG/1
50 TABLET ORAL DAILY
Qty: 90 TABLET | Refills: 3 | Status: SHIPPED | OUTPATIENT
Start: 2025-03-28

## 2025-03-28 NOTE — TELEPHONE ENCOUNTER
Samples ready for patient to  on the 4th floor.   Dr Archibald, Patient is wondering if theres an alternative for Entresto that his insurance might cover?

## 2025-04-01 NOTE — PROGRESS NOTES
STP to advise his insurance plan is out of network ([employer])   He is aware.  We discussed our nonparticipation status with this plan and they will not pay, or will greatly reduce patient resimbursement and payments.   He is aware and in agreement to redirect to an organization and provider that does accept his insurance plan   He is advised his copay for the visit will be $75.00  He is in agreement with this plan. Thank you

## 2025-04-10 ENCOUNTER — TELEPHONE (OUTPATIENT)
Dept: CARDIOLOGY | Age: 55
End: 2025-04-10
Payer: COMMERCIAL

## 2025-04-10 RX ORDER — METOPROLOL SUCCINATE 25 MG/1
25 TABLET, EXTENDED RELEASE ORAL 2 TIMES DAILY
Start: 2025-04-10

## 2025-04-10 RX ORDER — FUROSEMIDE 40 MG/1
40 TABLET ORAL DAILY
Qty: 90 TABLET | Refills: 3 | Status: SHIPPED | OUTPATIENT
Start: 2025-04-10

## 2025-04-10 NOTE — TELEPHONE ENCOUNTER
I sent in a prescription for Lasix.  Have him stop ibuprofen for pain and instead use Tylenol.  Can he also see me next week?

## 2025-04-10 NOTE — TELEPHONE ENCOUNTER
Pt called in because he noticed a couple days ago he has a little bit of swelling in his ankles.  He mentions he's been doing a lot of standing, which is new.  He denies edema anywhere else or any dyspnea.  He did have retinal sx about 1.5 months ago and while he was laid up in bed he gained about 15 lbs.    BP has been on average 125/90, Hr has been around 100 on average.  He's taking metoprolol succinate 25 mg BID (I just updated this).  Losartan rx was sent in for pt to start once he runs out of Entresto- he expects he'll be starting losartan here in the next couple of weeks.  He hasn't tried compression stockings yet but he plans to buy some today.    He's asking if a prescription for furosemide can be sent in for his ankle edema.  Do you have any recommendations for this patient?    Elin WRIGHT RN  [facility] [place] Triage  04/10/25  09:31 EDT

## 2025-04-10 NOTE — TELEPHONE ENCOUNTER
I talked to patient and advised.   He would like to keep his appointment for 4/22. He will give us a call if he has any issues before then.

## 2025-04-10 NOTE — TELEPHONE ENCOUNTER
I tried to call George R Magill but there was no answer.  Left a voicemail asking patient to call back.  Will continue to try to reach pt.    HUB- if pt calls back, please transfer through to triage.    Triage-  IP has openings on 4/15 at 11:30 and 4/16 at 14:30, or tomorrow at 10:30.    Elin WRIGHT RN  Triage [facility] [place]  04/10/25 10:52 EDT

## 2025-04-18 ENCOUNTER — DOCUMENTATION (OUTPATIENT)
Dept: CARDIOLOGY | Age: 55
End: 2025-04-18
Payer: COMMERCIAL

## 2025-04-22 ENCOUNTER — OFFICE VISIT (OUTPATIENT)
Age: 55
End: 2025-04-22

## 2025-04-22 VITALS
HEART RATE: 78 BPM | WEIGHT: 184 LBS | SYSTOLIC BLOOD PRESSURE: 112 MMHG | HEIGHT: 70 IN | DIASTOLIC BLOOD PRESSURE: 80 MMHG | OXYGEN SATURATION: 95 % | BODY MASS INDEX: 26.34 KG/M2

## 2025-04-22 DIAGNOSIS — I48.3 TYPICAL ATRIAL FLUTTER: ICD-10-CM

## 2025-04-22 DIAGNOSIS — I50.22 CHRONIC HFREF (HEART FAILURE WITH REDUCED EJECTION FRACTION): Primary | ICD-10-CM

## 2025-04-22 PROCEDURE — 99214 OFFICE O/P EST MOD 30 MIN: CPT | Performed by: STUDENT IN AN ORGANIZED HEALTH CARE EDUCATION/TRAINING PROGRAM

## 2025-04-22 RX ORDER — METOPROLOL SUCCINATE 50 MG/1
50 TABLET, EXTENDED RELEASE ORAL 2 TIMES DAILY
Qty: 180 TABLET | Refills: 3
Start: 2025-04-22 | End: 2025-04-28

## 2025-04-22 RX ORDER — PANTOPRAZOLE SODIUM 40 MG/1
40 TABLET, DELAYED RELEASE ORAL DAILY
COMMUNITY
Start: 2025-03-25 | End: 2025-05-24

## 2025-04-22 RX ORDER — ALBUTEROL SULFATE AND BUDESONIDE 90; 80 UG/1; UG/1
2 AEROSOL, METERED RESPIRATORY (INHALATION) EVERY 6 HOURS
COMMUNITY
Start: 2025-03-25

## 2025-04-22 NOTE — PROGRESS NOTES
Subjective:     Encounter Date:04/22/2025      Patient ID: George R Magill is a 54 y.o. male.    Chief Complaint:  Follow up of HFrEF, atrial flutter    HPI:   54 y.o. male with GERD, HFrEF (EF 26 to 30%) and atrial flutter who presents for follow-up.  Patient recently reestablished care here in February 2, 2025.  He had been lost to follow-up and was last seen prior to that in November 2023.  He was referred back to us after having been found to be tachycardic to the 140s during preop testing for retinal surgery.  He was noted to be in atrial flutter.  During his most recent visit, we increased Entresto to twice daily as he was only taking it daily.  He reported that he had not been taking Eliquis, Jardiance, or spironolactone.  His Eliquis was restarted.  He contacted our office that Entresto was too expensive so we switched him to losartan.  He also contacted complaining of lower extremity edema and requesting Lasix.  I sent in prescription for Lasix and we plan for follow-up.  He presents today and notes that his edema has resolved after initiation of Lasix.  He has also increased his metoprolol to 25 mg twice daily and notes that his heart rate is much better controlled and that he feels much better on this regimen.  During our last visit, I had also referred him to EP for definitive management of his atrial flutter.  We also ordered an echocardiogram for assessment of his EF.  These have yet to still occur.  He notes that he will be transferring care to [facility] given his insurance.      Per prior note:  Patient presented to the urgent care on 9/13/23 with complaints of chest pain and was found to be in atrial flutter.  He underwent successful HERRERA cardioversion on 9/14/2023.  HERRERA revealed EF of 26 to 30% and this was believed to be secondary to tachycardia induced cardiomyopathy.  Given intermittent ectopy, he was started on amiodarone load and was discharged home on Eliquis 5 mg twice daily, Toprol 25 mg  daily, and losartan 25 mg daily.  Since discharge, he notes that he has felt well, he denies any dyspnea exertion or chest pain.  He also denies any palpitations.         The following portions of the patient's history were reviewed and updated as appropriate: allergies, current medications, past family history, past medical history, past social history, past surgical history and problem list.     REVIEW OF SYSTEMS:   All systems reviewed.  Pertinent positives identified in HPI.  All other systems are negative.    Past Medical History:   Diagnosis Date    Abnormal ECG 2023    Asthma     childhood    Atrial fibrillation 2023    GERD (gastroesophageal reflux disease)     Hypertension 2023       Family History   Problem Relation Age of Onset    Prostate cancer Father     Heart attack Father     Heart disease Father     Heart failure Father        Social History     Socioeconomic History    Marital status: Single   Tobacco Use    Smoking status: Former     Current packs/day: 0.00     Average packs/day: 1 pack/day for 33.2 years (33.2 ttl pk-yrs)     Types: Cigarettes     Start date: 1990     Quit date: 2023     Years since quittin.5     Passive exposure: Never    Smokeless tobacco: Never    Tobacco comments:     ave one cigarette daily    Vaping Use    Vaping status: Never Used   Substance and Sexual Activity    Alcohol use: Not Currently     Alcohol/week: 1.0 standard drink of alcohol     Types: 1 Glasses of wine per week     Comment:  for 30+ years. Chose marijuana over alcohol.ww,    Drug use: Not Currently     Types: Cocaine(coke), Marijuana     Comment: No coke for 2+years. Still smoke marijuana occasionally ( mo    Sexual activity: Not Currently     Partners: Female     Birth control/protection: Condom, Coitus interruptus, None     Comment: Used Cialis and viagra       Allergies   Allergen Reactions    Asa [Aspirin] Hives and Swelling     Left eye swells         Past Surgical History:   Procedure Laterality Date    ABLATION OF DYSRHYTHMIC FOCUS      COLONOSCOPY W/ POLYPECTOMY N/A 11/20/2019    Procedure: COLONOSCOPY to cecum wit cold snare polypectomies;  Surgeon: Calvin Dacosta MD;  Location: [facility] ENDOSCOPY;  Service: Gastroenterology    ENDOSCOPY N/A 11/20/2019    Procedure: ESOPHAGOGASTRODUODENOSCOPY WITH BIOPSY;  Surgeon: Calvin Dacosta MD;  Location: [facility] ENDOSCOPY;  Service: Gastroenterology    WISDOM TOOTH EXTRACTION         Procedures       Objective:         Vitals:    04/22/25 1116   BP: 112/80   Pulse: 78   SpO2: 95%         PHYSICAL EXAM:  GEN: well appearing, in NAD   HEENT: NCAT, EOMI, moist mucus membranes   Respiratory: Diffuse wheezing  CV: Tachycardic rate, irregularly irregular rhythm, normal S1, S2, no murmurs, rubs, gallops, +2 radial pulses b/l  GI: soft, nontender, nondistended  MSK: no edema  Skin: no rash, warm, dry  Heme/Lymph: no bruising or bleeding  Neuro: Alert and Oriented x 3, grossly normal motor function        Assessment:         (I50.22) Chronic HFrEF (heart failure with reduced ejection fraction)    (I48.3) Typical atrial flutter    54 y.o. male with GERD, HFrEF (EF 26 to 30%) and atrial flutter who presents for follow-up.       Plan:       #HFrEF  NYHA II.  EF 26 to 30% on last check.  Differential included tachycardia induced cardiomyopathy given atrial flutter.  He underwent successful HERRERA cardioversion in 2023 but unfortunately was subsequently lost to follow-up.  Plan was to obtain right and left heart catheterization for further evaluation at that time but due to loss of follow-up this was not performed.  He has been nonadherent to some of his medications.  He appears well compensated on exam.  - Increase Toprol to 50 mg twice daily, continue losartan 50 mg daily  -Will be transferring care to [facility] and has an appointment in June  - Okay to hold off on spironolactone and Jardiance for now  -  Echocardiogram for reevaluation of EF    #Atrial flutter  Status post successful HERRERA cardioversion in September 2023.  Unfortunately patient was lost to follow-up after November and we never had a chance to reevaluate echocardiogram.  He is back in atrial flutter at this time.  Asymptomatic.  Heart rates appear to be better controlled than what was reported during his prior preop testing.  - We discussed another attempt at HERRERA/DCCV though this is not urgent as he is well compensated and not very tachycardic.  We also discussed referral to EP for possible other therapies.  Will refer to EP for further evaluation.  - Stressed importance of Eliquis adherence, he can start taking it once able to from a surgical standpoint.  - Continue Toprol and Eliquis as above    Patient will be establishing care with [facility].  If he is unable to do so, would arrange follow-up in 3 months.       James Archibald MD  04/22/25  [facility]    Outpatient Encounter Medications as of 4/22/2025   Medication Sig Dispense Refill    Albuterol-Budesonide (Airsupra) 90-80 MCG/ACT aerosol Inhale 2 puffs Every 6 (Six) Hours.      apixaban (ELIQUIS) 5 MG tablet tablet Take 1 tablet by mouth Every 12 (Twelve) Hours. Indications: Atrial Fibrillation 28 tablet 0    furosemide (LASIX) 40 MG tablet Take 1 tablet by mouth Daily. 90 tablet 3    losartan (COZAAR) 50 MG tablet Take 1 tablet by mouth Daily. 90 tablet 3    metoprolol succinate XL (TOPROL-XL) 50 MG 24 hr tablet Take 1 tablet by mouth 2 (Two) Times a Day. 180 tablet 3    omeprazole (priLOSEC) 40 MG capsule Take 1 capsule by mouth As Needed. Patient states he takes three times a day      pantoprazole (PROTONIX) 40 MG EC tablet Take 1 tablet by mouth Daily.      [DISCONTINUED] metoprolol succinate XL (TOPROL-XL) 25 MG 24 hr tablet Take 1 tablet by mouth 2 (Two) Times a Day.       No facility-administered encounter medications on file as of 4/22/2025.

## 2025-04-28 ENCOUNTER — TELEPHONE (OUTPATIENT)
Dept: CARDIOLOGY | Age: 55
End: 2025-04-28
Payer: COMMERCIAL

## 2025-04-28 RX ORDER — METOPROLOL SUCCINATE 50 MG/1
50 TABLET, EXTENDED RELEASE ORAL 2 TIMES DAILY
Qty: 180 TABLET | Refills: 3
Start: 2025-04-28

## 2025-04-28 NOTE — TELEPHONE ENCOUNTER
Pt called the office today. He said Severino never got the metoprolol 50 mg BID script. Checked order placed by Dr. Archibald and interface did not work to send script over. Attempted to send script again today and the interface didn't go through again. Called and spoke with a pharmacist. Verbal order given over the phone to pharmacist. Notified pt. He verbalized understanding.    Thank you,    Maribel Avila, RN  Triage [facility] [place]  04/28/25 10:31 EDT

## (undated) DEVICE — KT VLV BIOGUARD SXN BIOP AIR/H20 CONN 4PC DISP

## (undated) DEVICE — TUBING, SUCTION, 1/4" X 10', STRAIGHT: Brand: MEDLINE

## (undated) DEVICE — SENSR O2 OXIMAX FNGR A/ 18IN NONSTR

## (undated) DEVICE — THE TORRENT IRRIGATION SCOPE CONNECTOR IS USED WITH THE TORRENT IRRIGATION TUBING TO PROVIDE IRRIGATION FLUIDS SUCH AS STERILE WATER DURING GASTROINTESTINAL ENDOSCOPIC PROCEDURES WHEN USED IN CONJUNCTION WITH AN IRRIGATION PUMP (OR ELECTROSURGICAL UNIT).: Brand: TORRENT

## (undated) DEVICE — BITEBLOCK OMNI BLOC

## (undated) DEVICE — FRCP BX RADJAW4 NDL 2.8 240CM LG OG BX40

## (undated) DEVICE — CANN NASL CO2 TRULINK W/O2 A/

## (undated) DEVICE — THE SINGLE USE ETRAP – POLYP TRAP IS USED FOR SUCTION RETRIEVAL OF ENDOSCOPICALLY REMOVED POLYPS.: Brand: ETRAP

## (undated) DEVICE — SNAR POLYP SENSATION STDOVL 27 240 BX40